# Patient Record
Sex: MALE | HISPANIC OR LATINO | URBAN - METROPOLITAN AREA
[De-identification: names, ages, dates, MRNs, and addresses within clinical notes are randomized per-mention and may not be internally consistent; named-entity substitution may affect disease eponyms.]

---

## 2019-05-16 ENCOUNTER — EMERGENCY (EMERGENCY)
Facility: HOSPITAL | Age: 34
LOS: 1 days | Discharge: HOME | End: 2019-05-16
Attending: EMERGENCY MEDICINE | Admitting: EMERGENCY MEDICINE
Payer: COMMERCIAL

## 2019-05-16 VITALS
SYSTOLIC BLOOD PRESSURE: 129 MMHG | TEMPERATURE: 98 F | DIASTOLIC BLOOD PRESSURE: 65 MMHG | OXYGEN SATURATION: 98 % | RESPIRATION RATE: 18 BRPM | HEART RATE: 58 BPM

## 2019-05-16 VITALS
TEMPERATURE: 98 F | WEIGHT: 189.6 LBS | OXYGEN SATURATION: 97 % | HEIGHT: 60 IN | HEART RATE: 78 BPM | DIASTOLIC BLOOD PRESSURE: 126 MMHG | SYSTOLIC BLOOD PRESSURE: 126 MMHG | RESPIRATION RATE: 18 BRPM

## 2019-05-16 DIAGNOSIS — R10.30 LOWER ABDOMINAL PAIN, UNSPECIFIED: ICD-10-CM

## 2019-05-16 DIAGNOSIS — K92.0 HEMATEMESIS: ICD-10-CM

## 2019-05-16 LAB
ALBUMIN SERPL ELPH-MCNC: 4.9 G/DL — SIGNIFICANT CHANGE UP (ref 3.3–5)
ALP SERPL-CCNC: 60 U/L — SIGNIFICANT CHANGE UP (ref 40–120)
ALT FLD-CCNC: 27 U/L — SIGNIFICANT CHANGE UP (ref 10–45)
ANION GAP SERPL CALC-SCNC: 14 MMOL/L — SIGNIFICANT CHANGE UP (ref 5–17)
APPEARANCE UR: CLEAR — SIGNIFICANT CHANGE UP
APTT BLD: 31.5 SEC — SIGNIFICANT CHANGE UP (ref 27.5–36.3)
AST SERPL-CCNC: 27 U/L — SIGNIFICANT CHANGE UP (ref 10–40)
BILIRUB SERPL-MCNC: 0.4 MG/DL — SIGNIFICANT CHANGE UP (ref 0.2–1.2)
BILIRUB UR-MCNC: NEGATIVE — SIGNIFICANT CHANGE UP
BUN SERPL-MCNC: 10 MG/DL — SIGNIFICANT CHANGE UP (ref 7–23)
CALCIUM SERPL-MCNC: 9.4 MG/DL — SIGNIFICANT CHANGE UP (ref 8.4–10.5)
CHLORIDE SERPL-SCNC: 103 MMOL/L — SIGNIFICANT CHANGE UP (ref 96–108)
CO2 SERPL-SCNC: 23 MMOL/L — SIGNIFICANT CHANGE UP (ref 22–31)
COLOR SPEC: YELLOW — SIGNIFICANT CHANGE UP
CREAT SERPL-MCNC: 1.01 MG/DL — SIGNIFICANT CHANGE UP (ref 0.5–1.3)
DIFF PNL FLD: NEGATIVE — SIGNIFICANT CHANGE UP
GLUCOSE SERPL-MCNC: 95 MG/DL — SIGNIFICANT CHANGE UP (ref 70–99)
GLUCOSE UR QL: NEGATIVE — SIGNIFICANT CHANGE UP
HCT VFR BLD CALC: 45.2 % — SIGNIFICANT CHANGE UP (ref 39–50)
HGB BLD-MCNC: 15.2 G/DL — SIGNIFICANT CHANGE UP (ref 13–17)
INR BLD: 0.98 — SIGNIFICANT CHANGE UP (ref 0.88–1.16)
KETONES UR-MCNC: NEGATIVE — SIGNIFICANT CHANGE UP
LEUKOCYTE ESTERASE UR-ACNC: NEGATIVE — SIGNIFICANT CHANGE UP
LIDOCAIN IGE QN: 22 U/L — SIGNIFICANT CHANGE UP (ref 7–60)
MCHC RBC-ENTMCNC: 30.3 PG — SIGNIFICANT CHANGE UP (ref 27–34)
MCHC RBC-ENTMCNC: 33.6 GM/DL — SIGNIFICANT CHANGE UP (ref 32–36)
MCV RBC AUTO: 90 FL — SIGNIFICANT CHANGE UP (ref 80–100)
NITRITE UR-MCNC: NEGATIVE — SIGNIFICANT CHANGE UP
NRBC # BLD: 0 /100 WBCS — SIGNIFICANT CHANGE UP (ref 0–0)
OB PNL STL: NEGATIVE — SIGNIFICANT CHANGE UP
PH UR: 6.5 — SIGNIFICANT CHANGE UP (ref 5–8)
PLATELET # BLD AUTO: 371 K/UL — SIGNIFICANT CHANGE UP (ref 150–400)
POTASSIUM SERPL-MCNC: 4.4 MMOL/L — SIGNIFICANT CHANGE UP (ref 3.5–5.3)
POTASSIUM SERPL-SCNC: 4.4 MMOL/L — SIGNIFICANT CHANGE UP (ref 3.5–5.3)
PROT SERPL-MCNC: 8.5 G/DL — HIGH (ref 6–8.3)
PROT UR-MCNC: NEGATIVE MG/DL — SIGNIFICANT CHANGE UP
PROTHROM AB SERPL-ACNC: 11.1 SEC — SIGNIFICANT CHANGE UP (ref 10–12.9)
RBC # BLD: 5.02 M/UL — SIGNIFICANT CHANGE UP (ref 4.2–5.8)
RBC # FLD: 13.2 % — SIGNIFICANT CHANGE UP (ref 10.3–14.5)
SODIUM SERPL-SCNC: 140 MMOL/L — SIGNIFICANT CHANGE UP (ref 135–145)
SP GR SPEC: <=1.005 — SIGNIFICANT CHANGE UP (ref 1–1.03)
UROBILINOGEN FLD QL: 0.2 E.U./DL — SIGNIFICANT CHANGE UP
WBC # BLD: 12.78 K/UL — HIGH (ref 3.8–10.5)
WBC # FLD AUTO: 12.78 K/UL — HIGH (ref 3.8–10.5)

## 2019-05-16 PROCEDURE — 81003 URINALYSIS AUTO W/O SCOPE: CPT

## 2019-05-16 PROCEDURE — 71046 X-RAY EXAM CHEST 2 VIEWS: CPT

## 2019-05-16 PROCEDURE — 99285 EMERGENCY DEPT VISIT HI MDM: CPT

## 2019-05-16 PROCEDURE — 96375 TX/PRO/DX INJ NEW DRUG ADDON: CPT | Mod: XU

## 2019-05-16 PROCEDURE — 85610 PROTHROMBIN TIME: CPT

## 2019-05-16 PROCEDURE — 80053 COMPREHEN METABOLIC PANEL: CPT

## 2019-05-16 PROCEDURE — 85027 COMPLETE CBC AUTOMATED: CPT

## 2019-05-16 PROCEDURE — 71046 X-RAY EXAM CHEST 2 VIEWS: CPT | Mod: 26

## 2019-05-16 PROCEDURE — 83690 ASSAY OF LIPASE: CPT

## 2019-05-16 PROCEDURE — 96374 THER/PROPH/DIAG INJ IV PUSH: CPT | Mod: XU

## 2019-05-16 PROCEDURE — 74174 CTA ABD&PLVS W/CONTRAST: CPT | Mod: 26

## 2019-05-16 PROCEDURE — 96361 HYDRATE IV INFUSION ADD-ON: CPT

## 2019-05-16 PROCEDURE — 74174 CTA ABD&PLVS W/CONTRAST: CPT

## 2019-05-16 PROCEDURE — 99284 EMERGENCY DEPT VISIT MOD MDM: CPT | Mod: 25

## 2019-05-16 PROCEDURE — 85730 THROMBOPLASTIN TIME PARTIAL: CPT

## 2019-05-16 PROCEDURE — 36415 COLL VENOUS BLD VENIPUNCTURE: CPT

## 2019-05-16 RX ORDER — SODIUM CHLORIDE 9 MG/ML
1000 INJECTION INTRAMUSCULAR; INTRAVENOUS; SUBCUTANEOUS ONCE
Refills: 0 | Status: COMPLETED | OUTPATIENT
Start: 2019-05-16 | End: 2019-05-16

## 2019-05-16 RX ORDER — PANTOPRAZOLE SODIUM 20 MG/1
80 TABLET, DELAYED RELEASE ORAL ONCE
Refills: 0 | Status: COMPLETED | OUTPATIENT
Start: 2019-05-16 | End: 2019-05-16

## 2019-05-16 RX ORDER — HYDROMORPHONE HYDROCHLORIDE 2 MG/ML
0.5 INJECTION INTRAMUSCULAR; INTRAVENOUS; SUBCUTANEOUS ONCE
Refills: 0 | Status: DISCONTINUED | OUTPATIENT
Start: 2019-05-16 | End: 2019-05-16

## 2019-05-16 RX ADMIN — SODIUM CHLORIDE 1000 MILLILITER(S): 9 INJECTION INTRAMUSCULAR; INTRAVENOUS; SUBCUTANEOUS at 12:38

## 2019-05-16 RX ADMIN — HYDROMORPHONE HYDROCHLORIDE 0.5 MILLIGRAM(S): 2 INJECTION INTRAMUSCULAR; INTRAVENOUS; SUBCUTANEOUS at 10:28

## 2019-05-16 RX ADMIN — HYDROMORPHONE HYDROCHLORIDE 0.5 MILLIGRAM(S): 2 INJECTION INTRAMUSCULAR; INTRAVENOUS; SUBCUTANEOUS at 12:38

## 2019-05-16 RX ADMIN — PANTOPRAZOLE SODIUM 80 MILLIGRAM(S): 20 TABLET, DELAYED RELEASE ORAL at 10:15

## 2019-05-16 RX ADMIN — SODIUM CHLORIDE 1000 MILLILITER(S): 9 INJECTION INTRAMUSCULAR; INTRAVENOUS; SUBCUTANEOUS at 10:28

## 2019-05-16 NOTE — ED ADULT NURSE NOTE - CHPI ED NUR SYMPTOMS NEG
no abdominal distension/no burning urination/no chills/no diarrhea/no dysuria/no fever/no hematuria/no nausea/no vomiting

## 2019-05-16 NOTE — ED PROVIDER NOTE - CLINICAL SUMMARY MEDICAL DECISION MAKING FREE TEXT BOX
33M with hematemesis in context of alcohol abuse, no further episodes of vomiting in ER. Pt denies any other medical problems, no hx of w/d, no liver dz that is known. Pt reports "dark" stool and lower abd pain. No fever. Plan for CXR to eval mediastinum, plan for Abd CT to eval for reported rectal bleed although pt has nl hgb and hemeoccult neg. 33M with hematemesis in context of alcohol abuse, no further episodes of vomiting in ER. Pt denies any other medical problems, no hx of w/d, no liver dz that is known. Pt reports "dark" stool and lower abd pain. No fever. Plan for CXR to eval mediastinum, plan for Abd CT to eval for reported rectal bleed although pt has nl hgb and hemeoccult neg.    Pt obs'd in ED for hours, no further episodes of hematemesis. Can f/u with GI. Nl BUN, doubt severe UGIB. Neg Hemeoccult, neg CTA of abd/pelvis, must f/u with GI. Pain improved pt rodney PO, UA neg, labs notable for mild elevation in wbc count only.

## 2019-05-16 NOTE — ED PROVIDER NOTE - CARE PLAN
Principal Discharge DX:	Lower abdominal pain  Secondary Diagnosis:	Hematemesis, presence of nausea not specified

## 2019-05-16 NOTE — ED PROVIDER NOTE - PHYSICAL EXAMINATION
Gen: well appearing no acute distress conversant  HEENT: oropharynx clear  CV: rrr no murmur  Resp: ctab  Abd: lower abd ttp no rebound/guarding  Rectal: no hematochezia no melena  Ext: no edema to ext  MSK: no cva TTP, no midline back ttp c-s spine

## 2019-05-16 NOTE — ED ADULT NURSE NOTE - NSIMPLEMENTINTERV_GEN_ALL_ED
Implemented All Universal Safety Interventions:  [place] to call system. Call bell, personal items and telephone within reach. Instruct patient to call for assistance. Room bathroom lighting operational. Non-slip footwear when patient is off stretcher. Physically safe environment: no spills, clutter or unnecessary equipment. Stretcher in lowest position, wheels locked, appropriate side rails in place.

## 2019-05-16 NOTE — ED PROVIDER NOTE - OBJECTIVE STATEMENT
33M with hx of alcohol abuse, presenting with lower abd pain and vomiting blood 3x, pt states he may have vomited approx 2 ounces of blood. Spoke to pt in preferred language of Somali. Pt denies any hx of abd surg, no hx of taking medications. States he drinks 3-4 beers daily. Denies hx of withdrawal. Pt reports "dark" stool. states he always has loose stool.

## 2019-05-16 NOTE — ED ADULT NURSE NOTE - OBJECTIVE STATEMENT
32 y/o a&ox3 walked in from front [facility] c/o abdominal Pain. Pt reports abdominal pain for the past 2 month LLQ/RLQ that radiates to the lower back. reports x 4 episodes of bright red blood this morning. Pain upon palpation of lower abdomen. reports ETOH use yesterday. ambulates with a steady gait. denies n/v/d currently. denies fevers chills, SOB, chest pain. no daily mediation use. no blood thinners.

## 2019-05-16 NOTE — ED PROVIDER NOTE - CARE PROVIDER_API CALL
Emmanuel Brown)  [facility]  [address], [address]  [place] [address]  Phone: [phone]  Fax: [phone]  Follow Up Time:

## 2020-12-30 ENCOUNTER — INPATIENT (INPATIENT)
Facility: HOSPITAL | Age: 35
LOS: 1 days | Discharge: HOME | DRG: 241 | End: 2021-01-01
Attending: FAMILY MEDICINE | Admitting: HOSPITALIST
Payer: MEDICAID

## 2020-12-30 VITALS
DIASTOLIC BLOOD PRESSURE: 112 MMHG | OXYGEN SATURATION: 100 % | TEMPERATURE: 98 F | HEART RATE: 88 BPM | SYSTOLIC BLOOD PRESSURE: 127 MMHG | RESPIRATION RATE: 22 BRPM

## 2020-12-30 LAB
ALBUMIN SERPL ELPH-MCNC: 4.1 G/DL — SIGNIFICANT CHANGE UP (ref 3.3–5)
ALP SERPL-CCNC: 72 U/L — SIGNIFICANT CHANGE UP (ref 40–120)
ALT FLD-CCNC: 47 U/L — SIGNIFICANT CHANGE UP (ref 12–78)
ANION GAP SERPL CALC-SCNC: 10 MMOL/L — SIGNIFICANT CHANGE UP (ref 5–17)
APPEARANCE UR: CLEAR — SIGNIFICANT CHANGE UP
APTT BLD: 30.8 SEC — SIGNIFICANT CHANGE UP (ref 27.5–35.5)
AST SERPL-CCNC: 42 U/L — HIGH (ref 15–37)
BASOPHILS # BLD AUTO: 0.12 K/UL — SIGNIFICANT CHANGE UP (ref 0–0.2)
BASOPHILS NFR BLD AUTO: 1.4 % — SIGNIFICANT CHANGE UP (ref 0–2)
BILIRUB SERPL-MCNC: 0.4 MG/DL — SIGNIFICANT CHANGE UP (ref 0.2–1.2)
BILIRUB UR-MCNC: NEGATIVE — SIGNIFICANT CHANGE UP
BUN SERPL-MCNC: 12 MG/DL — SIGNIFICANT CHANGE UP (ref 7–23)
CALCIUM SERPL-MCNC: 8.7 MG/DL — SIGNIFICANT CHANGE UP (ref 8.5–10.1)
CHLORIDE SERPL-SCNC: 102 MMOL/L — SIGNIFICANT CHANGE UP (ref 96–108)
CO2 SERPL-SCNC: 27 MMOL/L — SIGNIFICANT CHANGE UP (ref 22–31)
COLOR SPEC: YELLOW — SIGNIFICANT CHANGE UP
CREAT SERPL-MCNC: 1.02 MG/DL — SIGNIFICANT CHANGE UP (ref 0.5–1.3)
DIFF PNL FLD: NEGATIVE — SIGNIFICANT CHANGE UP
EOSINOPHIL # BLD AUTO: 0.06 K/UL — SIGNIFICANT CHANGE UP (ref 0–0.5)
EOSINOPHIL NFR BLD AUTO: 0.7 % — SIGNIFICANT CHANGE UP (ref 0–6)
GLUCOSE SERPL-MCNC: 108 MG/DL — HIGH (ref 70–99)
GLUCOSE UR QL: NEGATIVE MG/DL — SIGNIFICANT CHANGE UP
HCT VFR BLD CALC: 41.9 % — SIGNIFICANT CHANGE UP (ref 39–50)
HCT VFR BLD CALC: 46.4 % — SIGNIFICANT CHANGE UP (ref 39–50)
HGB BLD-MCNC: 14.1 G/DL — SIGNIFICANT CHANGE UP (ref 13–17)
HGB BLD-MCNC: 16.3 G/DL — SIGNIFICANT CHANGE UP (ref 13–17)
IMM GRANULOCYTES NFR BLD AUTO: 0.2 % — SIGNIFICANT CHANGE UP (ref 0–1.5)
INR BLD: 0.97 RATIO — SIGNIFICANT CHANGE UP (ref 0.88–1.16)
KETONES UR-MCNC: NEGATIVE — SIGNIFICANT CHANGE UP
LACTATE SERPL-SCNC: 2.8 MMOL/L — HIGH (ref 0.7–2)
LEUKOCYTE ESTERASE UR-ACNC: NEGATIVE — SIGNIFICANT CHANGE UP
LIDOCAIN IGE QN: 131 U/L — SIGNIFICANT CHANGE UP (ref 73–393)
LYMPHOCYTES # BLD AUTO: 3.7 K/UL — HIGH (ref 1–3.3)
LYMPHOCYTES # BLD AUTO: 42.4 % — SIGNIFICANT CHANGE UP (ref 13–44)
MCHC RBC-ENTMCNC: 31.6 PG — SIGNIFICANT CHANGE UP (ref 27–34)
MCHC RBC-ENTMCNC: 35.1 GM/DL — SIGNIFICANT CHANGE UP (ref 32–36)
MCV RBC AUTO: 89.9 FL — SIGNIFICANT CHANGE UP (ref 80–100)
MONOCYTES # BLD AUTO: 0.67 K/UL — SIGNIFICANT CHANGE UP (ref 0–0.9)
MONOCYTES NFR BLD AUTO: 7.7 % — SIGNIFICANT CHANGE UP (ref 2–14)
NEUTROPHILS # BLD AUTO: 4.15 K/UL — SIGNIFICANT CHANGE UP (ref 1.8–7.4)
NEUTROPHILS NFR BLD AUTO: 47.6 % — SIGNIFICANT CHANGE UP (ref 43–77)
NITRITE UR-MCNC: NEGATIVE — SIGNIFICANT CHANGE UP
PH UR: 8 — SIGNIFICANT CHANGE UP (ref 5–8)
PLATELET # BLD AUTO: 322 K/UL — SIGNIFICANT CHANGE UP (ref 150–400)
POTASSIUM SERPL-MCNC: 3.5 MMOL/L — SIGNIFICANT CHANGE UP (ref 3.5–5.3)
POTASSIUM SERPL-SCNC: 3.5 MMOL/L — SIGNIFICANT CHANGE UP (ref 3.5–5.3)
PROT SERPL-MCNC: 8.1 GM/DL — SIGNIFICANT CHANGE UP (ref 6–8.3)
PROT UR-MCNC: NEGATIVE MG/DL — SIGNIFICANT CHANGE UP
PROTHROM AB SERPL-ACNC: 11.3 SEC — SIGNIFICANT CHANGE UP (ref 10.6–13.6)
RBC # BLD: 5.16 M/UL — SIGNIFICANT CHANGE UP (ref 4.2–5.8)
RBC # FLD: 14 % — SIGNIFICANT CHANGE UP (ref 10.3–14.5)
SODIUM SERPL-SCNC: 139 MMOL/L — SIGNIFICANT CHANGE UP (ref 135–145)
SP GR SPEC: 1.01 — SIGNIFICANT CHANGE UP (ref 1.01–1.02)
UROBILINOGEN FLD QL: NEGATIVE MG/DL — SIGNIFICANT CHANGE UP
WBC # BLD: 8.72 K/UL — SIGNIFICANT CHANGE UP (ref 3.8–10.5)
WBC # FLD AUTO: 8.72 K/UL — SIGNIFICANT CHANGE UP (ref 3.8–10.5)

## 2020-12-30 PROCEDURE — 71045 X-RAY EXAM CHEST 1 VIEW: CPT | Mod: 26

## 2020-12-30 PROCEDURE — 74177 CT ABD & PELVIS W/CONTRAST: CPT | Mod: 26

## 2020-12-30 RX ORDER — SODIUM CHLORIDE 9 MG/ML
1000 INJECTION INTRAMUSCULAR; INTRAVENOUS; SUBCUTANEOUS ONCE
Refills: 0 | Status: COMPLETED | OUTPATIENT
Start: 2020-12-30 | End: 2020-12-30

## 2020-12-30 RX ORDER — PANTOPRAZOLE SODIUM 20 MG/1
8 TABLET, DELAYED RELEASE ORAL
Qty: 80 | Refills: 0 | Status: DISCONTINUED | OUTPATIENT
Start: 2020-12-30 | End: 2021-01-01

## 2020-12-30 RX ORDER — ONDANSETRON 8 MG/1
4 TABLET, FILM COATED ORAL ONCE
Refills: 0 | Status: COMPLETED | OUTPATIENT
Start: 2020-12-30 | End: 2020-12-30

## 2020-12-30 RX ORDER — PANTOPRAZOLE SODIUM 20 MG/1
80 TABLET, DELAYED RELEASE ORAL ONCE
Refills: 0 | Status: COMPLETED | OUTPATIENT
Start: 2020-12-30 | End: 2020-12-30

## 2020-12-30 RX ORDER — HYDROMORPHONE HYDROCHLORIDE 2 MG/ML
1 INJECTION INTRAMUSCULAR; INTRAVENOUS; SUBCUTANEOUS ONCE
Refills: 0 | Status: DISCONTINUED | OUTPATIENT
Start: 2020-12-30 | End: 2020-12-30

## 2020-12-30 RX ADMIN — SODIUM CHLORIDE 1000 MILLILITER(S): 9 INJECTION INTRAMUSCULAR; INTRAVENOUS; SUBCUTANEOUS at 20:45

## 2020-12-30 RX ADMIN — HYDROMORPHONE HYDROCHLORIDE 1 MILLIGRAM(S): 2 INJECTION INTRAMUSCULAR; INTRAVENOUS; SUBCUTANEOUS at 19:22

## 2020-12-30 RX ADMIN — SODIUM CHLORIDE 1000 MILLILITER(S): 9 INJECTION INTRAMUSCULAR; INTRAVENOUS; SUBCUTANEOUS at 19:22

## 2020-12-30 RX ADMIN — PANTOPRAZOLE SODIUM 10 MG/HR: 20 TABLET, DELAYED RELEASE ORAL at 22:54

## 2020-12-30 RX ADMIN — ONDANSETRON 4 MILLIGRAM(S): 8 TABLET, FILM COATED ORAL at 19:22

## 2020-12-30 RX ADMIN — SODIUM CHLORIDE 1000 MILLILITER(S): 9 INJECTION INTRAMUSCULAR; INTRAVENOUS; SUBCUTANEOUS at 22:54

## 2020-12-30 RX ADMIN — PANTOPRAZOLE SODIUM 80 MILLIGRAM(S): 20 TABLET, DELAYED RELEASE ORAL at 19:22

## 2020-12-30 NOTE — ED PROVIDER NOTE - OBJECTIVE STATEMENT
36 y/o male with no pertinent PMHx presents to the ED c/o abd pain, vomiting blood onset yesterday. Pt states he drinks everyday, approximately 6 beers. States he has been having black stools x2 years; pt has followed up with GI with colonoscopies and endoscopies. Follows with PMD in [place]. Current everyday smoker, 1-2 per day, former drug user. NKDA.

## 2020-12-30 NOTE — ED PROVIDER NOTE - CONSTITUTIONAL, MLM
normal... Writhing on stretcher, uncomfortable appearing , awake, alert, oriented to person, place, time/situation. +dried blood on shirt

## 2020-12-30 NOTE — ED PROVIDER NOTE - PROGRESS NOTE DETAILS
Pt with normal hemoglobin. Heme neg rectal exam [id number] exp 9/30/21Joseluis LARSEN Late note: pt admitted due to drop in Hb although the guiac was neg. Joseluis LARSEN

## 2020-12-30 NOTE — ED PROVIDER NOTE - CLINICAL SUMMARY MEDICAL DECISION MAKING FREE TEXT BOX
Likely GI bleed, will obtain labs, pain control, likely admission. Likely GI bleed, Protonix drip, reassess.

## 2020-12-31 DIAGNOSIS — K92.2 GASTROINTESTINAL HEMORRHAGE, UNSPECIFIED: ICD-10-CM

## 2020-12-31 LAB
ANION GAP SERPL CALC-SCNC: 8 MMOL/L — SIGNIFICANT CHANGE UP (ref 5–17)
BUN SERPL-MCNC: 15 MG/DL — SIGNIFICANT CHANGE UP (ref 7–23)
CALCIUM SERPL-MCNC: 8.1 MG/DL — LOW (ref 8.5–10.1)
CHLORIDE SERPL-SCNC: 107 MMOL/L — SIGNIFICANT CHANGE UP (ref 96–108)
CO2 SERPL-SCNC: 25 MMOL/L — SIGNIFICANT CHANGE UP (ref 22–31)
CREAT SERPL-MCNC: 1.04 MG/DL — SIGNIFICANT CHANGE UP (ref 0.5–1.3)
GLUCOSE SERPL-MCNC: 76 MG/DL — SIGNIFICANT CHANGE UP (ref 70–99)
HCT VFR BLD CALC: 38.8 % — LOW (ref 39–50)
HCT VFR BLD CALC: 40.4 % — SIGNIFICANT CHANGE UP (ref 39–50)
HCT VFR BLD CALC: 40.5 % — SIGNIFICANT CHANGE UP (ref 39–50)
HCT VFR BLD CALC: 41 % — SIGNIFICANT CHANGE UP (ref 39–50)
HGB BLD-MCNC: 13.2 G/DL — SIGNIFICANT CHANGE UP (ref 13–17)
HGB BLD-MCNC: 13.3 G/DL — SIGNIFICANT CHANGE UP (ref 13–17)
HGB BLD-MCNC: 13.4 G/DL — SIGNIFICANT CHANGE UP (ref 13–17)
HGB BLD-MCNC: 13.6 G/DL — SIGNIFICANT CHANGE UP (ref 13–17)
MAGNESIUM SERPL-MCNC: 2.3 MG/DL — SIGNIFICANT CHANGE UP (ref 1.6–2.6)
MCHC RBC-ENTMCNC: 30.9 PG — SIGNIFICANT CHANGE UP (ref 27–34)
MCHC RBC-ENTMCNC: 30.9 PG — SIGNIFICANT CHANGE UP (ref 27–34)
MCHC RBC-ENTMCNC: 31.5 PG — SIGNIFICANT CHANGE UP (ref 27–34)
MCHC RBC-ENTMCNC: 31.7 PG — SIGNIFICANT CHANGE UP (ref 27–34)
MCHC RBC-ENTMCNC: 32.7 GM/DL — SIGNIFICANT CHANGE UP (ref 32–36)
MCHC RBC-ENTMCNC: 32.8 GM/DL — SIGNIFICANT CHANGE UP (ref 32–36)
MCHC RBC-ENTMCNC: 33.7 GM/DL — SIGNIFICANT CHANGE UP (ref 32–36)
MCHC RBC-ENTMCNC: 34 GM/DL — SIGNIFICANT CHANGE UP (ref 32–36)
MCV RBC AUTO: 93 FL — SIGNIFICANT CHANGE UP (ref 80–100)
MCV RBC AUTO: 93.5 FL — SIGNIFICANT CHANGE UP (ref 80–100)
MCV RBC AUTO: 94 FL — SIGNIFICANT CHANGE UP (ref 80–100)
MCV RBC AUTO: 94.7 FL — SIGNIFICANT CHANGE UP (ref 80–100)
PHOSPHATE SERPL-MCNC: 4.3 MG/DL — SIGNIFICANT CHANGE UP (ref 2.5–4.5)
PLATELET # BLD AUTO: 238 K/UL — SIGNIFICANT CHANGE UP (ref 150–400)
PLATELET # BLD AUTO: 252 K/UL — SIGNIFICANT CHANGE UP (ref 150–400)
PLATELET # BLD AUTO: 261 K/UL — SIGNIFICANT CHANGE UP (ref 150–400)
PLATELET # BLD AUTO: 276 K/UL — SIGNIFICANT CHANGE UP (ref 150–400)
POTASSIUM SERPL-MCNC: 3.9 MMOL/L — SIGNIFICANT CHANGE UP (ref 3.5–5.3)
POTASSIUM SERPL-SCNC: 3.9 MMOL/L — SIGNIFICANT CHANGE UP (ref 3.5–5.3)
RBC # BLD: 4.17 M/UL — LOW (ref 4.2–5.8)
RBC # BLD: 4.31 M/UL — SIGNIFICANT CHANGE UP (ref 4.2–5.8)
RBC # BLD: 4.32 M/UL — SIGNIFICANT CHANGE UP (ref 4.2–5.8)
RBC # BLD: 4.33 M/UL — SIGNIFICANT CHANGE UP (ref 4.2–5.8)
RBC # FLD: 13.7 % — SIGNIFICANT CHANGE UP (ref 10.3–14.5)
RBC # FLD: 14 % — SIGNIFICANT CHANGE UP (ref 10.3–14.5)
RBC # FLD: 14 % — SIGNIFICANT CHANGE UP (ref 10.3–14.5)
RBC # FLD: 14.3 % — SIGNIFICANT CHANGE UP (ref 10.3–14.5)
SARS-COV-2 IGG SERPL QL IA: NEGATIVE — SIGNIFICANT CHANGE UP
SARS-COV-2 IGM SERPL IA-ACNC: 0.07 INDEX — SIGNIFICANT CHANGE UP
SARS-COV-2 RNA SPEC QL NAA+PROBE: SIGNIFICANT CHANGE UP
SODIUM SERPL-SCNC: 140 MMOL/L — SIGNIFICANT CHANGE UP (ref 135–145)
WBC # BLD: 7.55 K/UL — SIGNIFICANT CHANGE UP (ref 3.8–10.5)
WBC # BLD: 8.53 K/UL — SIGNIFICANT CHANGE UP (ref 3.8–10.5)
WBC # BLD: 9.11 K/UL — SIGNIFICANT CHANGE UP (ref 3.8–10.5)
WBC # BLD: 9.49 K/UL — SIGNIFICANT CHANGE UP (ref 3.8–10.5)
WBC # FLD AUTO: 7.55 K/UL — SIGNIFICANT CHANGE UP (ref 3.8–10.5)
WBC # FLD AUTO: 8.53 K/UL — SIGNIFICANT CHANGE UP (ref 3.8–10.5)
WBC # FLD AUTO: 9.11 K/UL — SIGNIFICANT CHANGE UP (ref 3.8–10.5)
WBC # FLD AUTO: 9.49 K/UL — SIGNIFICANT CHANGE UP (ref 3.8–10.5)

## 2020-12-31 PROCEDURE — 83735 ASSAY OF MAGNESIUM: CPT

## 2020-12-31 PROCEDURE — 99223 1ST HOSP IP/OBS HIGH 75: CPT

## 2020-12-31 PROCEDURE — 85027 COMPLETE CBC AUTOMATED: CPT

## 2020-12-31 PROCEDURE — 90686 IIV4 VACC NO PRSV 0.5 ML IM: CPT

## 2020-12-31 PROCEDURE — G0008: CPT

## 2020-12-31 PROCEDURE — 86769 SARS-COV-2 COVID-19 ANTIBODY: CPT

## 2020-12-31 PROCEDURE — 80076 HEPATIC FUNCTION PANEL: CPT

## 2020-12-31 PROCEDURE — 80048 BASIC METABOLIC PNL TOTAL CA: CPT

## 2020-12-31 PROCEDURE — 36415 COLL VENOUS BLD VENIPUNCTURE: CPT

## 2020-12-31 PROCEDURE — 84100 ASSAY OF PHOSPHORUS: CPT

## 2020-12-31 PROCEDURE — C9113: CPT

## 2020-12-31 RX ORDER — THIAMINE MONONITRATE (VIT B1) 100 MG
100 TABLET ORAL DAILY
Refills: 0 | Status: DISCONTINUED | OUTPATIENT
Start: 2020-12-31 | End: 2021-01-01

## 2020-12-31 RX ORDER — ONDANSETRON 8 MG/1
4 TABLET, FILM COATED ORAL
Refills: 0 | Status: DISCONTINUED | OUTPATIENT
Start: 2020-12-31 | End: 2021-01-01

## 2020-12-31 RX ORDER — FOLIC ACID 0.8 MG
1 TABLET ORAL DAILY
Refills: 0 | Status: DISCONTINUED | OUTPATIENT
Start: 2020-12-31 | End: 2021-01-01

## 2020-12-31 RX ORDER — INFLUENZA VIRUS VACCINE 15; 15; 15; 15 UG/.5ML; UG/.5ML; UG/.5ML; UG/.5ML
0.5 SUSPENSION INTRAMUSCULAR ONCE
Refills: 0 | Status: COMPLETED | OUTPATIENT
Start: 2020-12-31 | End: 2021-01-01

## 2020-12-31 RX ORDER — LANOLIN ALCOHOL/MO/W.PET/CERES
5 CREAM (GRAM) TOPICAL AT BEDTIME
Refills: 0 | Status: DISCONTINUED | OUTPATIENT
Start: 2020-12-31 | End: 2021-01-01

## 2020-12-31 RX ORDER — SODIUM CHLORIDE 9 MG/ML
1000 INJECTION INTRAMUSCULAR; INTRAVENOUS; SUBCUTANEOUS
Refills: 0 | Status: DISCONTINUED | OUTPATIENT
Start: 2020-12-31 | End: 2021-01-01

## 2020-12-31 RX ADMIN — Medication 2 MILLIGRAM(S): at 13:56

## 2020-12-31 RX ADMIN — Medication 2 MILLIGRAM(S): at 09:15

## 2020-12-31 RX ADMIN — SODIUM CHLORIDE 75 MILLILITER(S): 9 INJECTION INTRAMUSCULAR; INTRAVENOUS; SUBCUTANEOUS at 03:40

## 2020-12-31 RX ADMIN — SODIUM CHLORIDE 75 MILLILITER(S): 9 INJECTION INTRAMUSCULAR; INTRAVENOUS; SUBCUTANEOUS at 17:15

## 2020-12-31 RX ADMIN — Medication 2 MILLIGRAM(S): at 03:32

## 2020-12-31 RX ADMIN — Medication 5 MILLIGRAM(S): at 23:38

## 2020-12-31 RX ADMIN — Medication 1 TABLET(S): at 09:14

## 2020-12-31 RX ADMIN — PANTOPRAZOLE SODIUM 10 MG/HR: 20 TABLET, DELAYED RELEASE ORAL at 17:15

## 2020-12-31 RX ADMIN — Medication 1 MILLIGRAM(S): at 09:14

## 2020-12-31 RX ADMIN — Medication 100 MILLIGRAM(S): at 09:14

## 2020-12-31 RX ADMIN — Medication 2 MILLIGRAM(S): at 21:30

## 2020-12-31 RX ADMIN — Medication 2 MILLIGRAM(S): at 17:15

## 2020-12-31 NOTE — H&P ADULT - ASSESSMENT
34 y/o M PMHx significant for alcohol dependency presents to  for further evaluation and management of c/o generalized abdominal pain, associated with an episode of hematemesis yesterday. The patient states that he drinks daily (approximately 6 beers) and has noted intermittent melenic bowel movements over the preceding 2 years. He states that he has followed up with his Gastroenterologist and has had surveillance colonoscopies and endoscopies since. Labs => LA 2.8 -> 1.3, Hgb/Hct 16.3/46.4 -> 14.1/41.9. CT ABD/Pelvis w/ IV contrast => Small hiatal hernia. Question of mild colitis of the proximal ascending colon.  34 y/o M PMHx significant for alcohol dependency presents to  for further evaluation and management of c/o generalized abdominal pain, associated with an episode of hematemesis yesterday. The patient states that he drinks daily (approximately 6 beers) and has noted intermittent melenic bowel movements over the preceding 2 years. He states that he has followed up with his Gastroenterologist and has had surveillance colonoscopies and endoscopies since. Labs => LA 2.8 -> 1.3, Hgb/Hct 16.3/46.4 -> 14.1/41.9. CT ABD/Pelvis w/ IV contrast => Small hiatal hernia. Question of mild colitis of the proximal ascending colon.     #GI Bleeding  ~admit to Medicine  ~likely source; upper GI  ~cont. PPI gtt per protocol  ~serial CBCs  ~Type & Screen  ~transfuse prn  ~patient agreeable to stay for further evaluation  ~f/u w/ formal Gastroenterology consultation in the am  ~NPO for now  ~cont. IV hydration    #Vte ppx  ~IMPROVE Vte Risk Score is 0  ~cont. SCDs for now 36 y/o M PMHx significant for alcohol dependency presents to  for further evaluation and management of c/o generalized abdominal pain, associated with an episode of hematemesis yesterday. The patient states that he drinks daily (approximately 6 beers) and has noted intermittent melenic bowel movements over the preceding 2 years. He states that he has followed up with his Gastroenterologist and has had surveillance colonoscopies and endoscopies since. Labs => LA 2.8 -> 1.3, Hgb/Hct 16.3/46.4 -> 14.1/41.9. CT ABD/Pelvis w/ IV contrast => Small hiatal hernia. Question of mild colitis of the proximal ascending colon.     #GI Bleeding  ~admit to Medicine  ~likely source; upper GI  ~cont. PPI gtt per protocol  ~serial CBCs  ~Type & Screen  ~transfuse prn  ~patient agreeable to stay for further evaluation  ~f/u w/ formal Gastroenterology consultation in the am  ~NPO for now  ~cont. IV hydration    #Alcohol Abuse  ~cont. CIWA protocol  ~cont. IV hydration    #Vte ppx  ~IMPROVE Vte Risk Score is 0  ~cont. SCDs for now

## 2020-12-31 NOTE — H&P ADULT - NSHPSOCIALHISTORY_GEN_ALL_CORE
Current everyday smoker, 1-2 per day, former drug user. Drinks 5-6 beers/daily  Current everyday smoker, 1-2 per day  former drug user.  Family lives in [place]

## 2020-12-31 NOTE — SBIRT NOTE ADULT - NSSBIRTALCPASSREFTXDET_GEN_A_CORE
pt acknowledges ETOH abuse however declines inpt rehab. Pt willing to try outpt ETOH rehab. Provided [facility] Folder-wstates will call [facility] or [facility] on own.

## 2020-12-31 NOTE — PATIENT PROFILE ADULT - TOBACCO USE
Current every day smoker GERD (gastroesophageal reflux disease) Hyperlipidemia, unspecified hyperlipidemia type

## 2020-12-31 NOTE — ED ADULT NURSE NOTE - OBJECTIVE STATEMENT
Pt c/o abdominal pain and vomiting x2 days. Pt reports vomiting blood and having dark bowel movement. Pt denies CP, SOB. Pt reports drinking alcohol every day. Last drink  yesterday morning. No diaphoresis noted.

## 2020-12-31 NOTE — H&P ADULT - NSHPPHYSICALEXAM_GEN_ALL_CORE
Vital Signs Last 24 Hrs  T(C): 36.8 (30 Dec 2020 20:20), Max: 36.8 (30 Dec 2020 20:20)  T(F): 98.2 (30 Dec 2020 20:20), Max: 98.2 (30 Dec 2020 20:20)  HR: 80 (30 Dec 2020 20:20) (80 - 88)  BP: 127/95 (30 Dec 2020 20:20) (127/95 - 127/112)  RR: 21 (30 Dec 2020 20:20) (21 - 22)  SpO2: 100% (30 Dec 2020 20:20) (100% - 100%)

## 2020-12-31 NOTE — ED ADULT NURSE NOTE - NSIMPLEMENTINTERV_GEN_ALL_ED
Implemented All Fall Risk Interventions:  [place] to call system. Call bell, personal items and telephone within reach. Instruct patient to call for assistance. Room bathroom lighting operational. Non-slip footwear when patient is off stretcher. Physically safe environment: no spills, clutter or unnecessary equipment. Stretcher in lowest position, wheels locked, appropriate side rails in place. Provide visual cue, wrist band, yellow gown, etc. Monitor gait and stability. Monitor for mental status changes and reorient to person, place, and time. Review medications for side effects contributing to fall risk. Reinforce activity limits and safety measures with patient and family.

## 2020-12-31 NOTE — H&P ADULT - HISTORY OF PRESENT ILLNESS
34 y/o M PMHx significant for alcohol dependency presents to  for further evaluation and management of c/o generalized abdominal pain, associated with an episode of hematemesis yesterday. The patient states that he drinks daily (approximately 6 beers) and has noted intermittent melenic bowel movements over the preceding 2 years. He states that he has followed up with his Gastroenterologist and has had surveillance colonoscopies and endoscopies since. Labs => LA 2.8 -> 1.3, Hgb/Hct 16.3/46.4 -> 14.1/41.9. CT ABD/Pelvis w/ IV contrast => Small hiatal hernia. Question of mild colitis of the proximal ascending colon.    34 y/o M PMHx significant for alcohol dependency presents to  for further evaluation and management of c/o epigastric abdominal pain, associated with an episode of hematemesis around 5pm (12/30). The patient states that he drinks daily (approximately 6 beers) and has noted intermittent melenic bowel movements over the preceding 2 years. He states that he was scheduled for a surveillance colonoscopy and endoscopy with his Gastroenterologist, but never followed through. Labs => LA 2.8 -> 1.3, Hgb/Hct 16.3/46.4 -> 14.1/41.9. CT ABD/Pelvis w/ IV contrast => Small hiatal hernia. Question of mild colitis of the proximal ascending colon.

## 2021-01-01 VITALS
TEMPERATURE: 97 F | SYSTOLIC BLOOD PRESSURE: 130 MMHG | DIASTOLIC BLOOD PRESSURE: 64 MMHG | OXYGEN SATURATION: 99 % | RESPIRATION RATE: 18 BRPM | HEART RATE: 62 BPM

## 2021-01-01 LAB
ANION GAP SERPL CALC-SCNC: 5 MMOL/L — SIGNIFICANT CHANGE UP (ref 5–17)
BUN SERPL-MCNC: 13 MG/DL — SIGNIFICANT CHANGE UP (ref 7–23)
CALCIUM SERPL-MCNC: 8.6 MG/DL — SIGNIFICANT CHANGE UP (ref 8.5–10.1)
CHLORIDE SERPL-SCNC: 108 MMOL/L — SIGNIFICANT CHANGE UP (ref 96–108)
CO2 SERPL-SCNC: 25 MMOL/L — SIGNIFICANT CHANGE UP (ref 22–31)
CREAT SERPL-MCNC: 1.03 MG/DL — SIGNIFICANT CHANGE UP (ref 0.5–1.3)
GLUCOSE SERPL-MCNC: 84 MG/DL — SIGNIFICANT CHANGE UP (ref 70–99)
MAGNESIUM SERPL-MCNC: 2.1 MG/DL — SIGNIFICANT CHANGE UP (ref 1.6–2.6)
PHOSPHATE SERPL-MCNC: 3.7 MG/DL — SIGNIFICANT CHANGE UP (ref 2.5–4.5)
POTASSIUM SERPL-MCNC: 3.8 MMOL/L — SIGNIFICANT CHANGE UP (ref 3.5–5.3)
POTASSIUM SERPL-SCNC: 3.8 MMOL/L — SIGNIFICANT CHANGE UP (ref 3.5–5.3)
SODIUM SERPL-SCNC: 138 MMOL/L — SIGNIFICANT CHANGE UP (ref 135–145)

## 2021-01-01 PROCEDURE — 99238 HOSP IP/OBS DSCHRG MGMT 30/<: CPT

## 2021-01-01 RX ORDER — FOLIC ACID 0.8 MG
1 TABLET ORAL
Qty: 0 | Refills: 0 | DISCHARGE
Start: 2021-01-01

## 2021-01-01 RX ORDER — THIAMINE MONONITRATE (VIT B1) 100 MG
1 TABLET ORAL
Qty: 0 | Refills: 0 | DISCHARGE
Start: 2021-01-01

## 2021-01-01 RX ADMIN — Medication 100 MILLIGRAM(S): at 13:11

## 2021-01-01 RX ADMIN — INFLUENZA VIRUS VACCINE 0.5 MILLILITER(S): 15; 15; 15; 15 SUSPENSION INTRAMUSCULAR at 13:16

## 2021-01-01 RX ADMIN — Medication 1.5 MILLIGRAM(S): at 05:48

## 2021-01-01 RX ADMIN — Medication 1 MILLIGRAM(S): at 13:11

## 2021-01-01 RX ADMIN — Medication 1 TABLET(S): at 13:11

## 2021-01-01 NOTE — DISCHARGE NOTE NURSING/CASE MANAGEMENT/SOCIAL WORK - PATIENT PORTAL LINK FT
You can access the FollowMyHealth Patient Portal offered by [facility] by registering at the following website: [email or web address]. By joining [employer]’s FollowMyHealth portal, you will also be able to view your health information using other applications (apps) compatible with our system.

## 2021-01-01 NOTE — DISCHARGE NOTE PROVIDER - HOSPITAL COURSE
· Subjective and Objective:   34 y/o M PMHx significant for alcohol dependency presents to  for further evaluation and management of c/o epigastric abdominal pain, associated with an episode of hematemesis around 5pm (12/30). The patient states that he drinks daily (approximately 6 beers) and has noted intermittent melenic bowel movements over the preceding 2 years. He states that he was scheduled for a surveillance colonoscopy and endoscopy with his Gastroenterologist, but never followed through. Labs => LA 2.8 -> 1.3, Hgb/Hct 16.3/46.4 -> 14.1/41.9. CT ABD/Pelvis w/ IV contrast => Small hiatal hernia. Question of mild colitis of the proximal ascending colon.     Patient seen today, feels well, no further nausea or vomiting. Reports no withdrawals from alcohol. Voiding well.   No chest pain or sob.       REVIEW OF SYSTEMS:  CONSTITUTIONAL: No weakness, fevers or chills  EYES/ENT: No visual changes;  No vertigo or throat pain   NECK: No pain or stiffness  RESPIRATORY: No cough, wheezing, hemoptysis; No shortness of breath  CARDIOVASCULAR: No chest pain or palpitations  GASTROINTESTINAL: No abdominal or epigastric pain. No nausea, vomiting, or hematemesis; No diarrhea or constipation. No melena or hematochezia.  GENITOURINARY: No dysuria, frequency or hematuria  NEUROLOGICAL: No numbness or weakness  SKIN: No itching, burning, rashes, or lesions   All other review of systems is negative unless indicated above    Vital Signs Last 24 Hrs  T(C): 36.3 (01 Jan 2021 10:03), Max: 36.8 (31 Dec 2020 21:23)  T(F): 97.4 (01 Jan 2021 10:03), Max: 98.2 (31 Dec 2020 21:23)  HR: 62 (01 Jan 2021 10:03) (53 - 77)  BP: 130/64 (01 Jan 2021 10:03) (117/67 - 138/92)  BP(mean): --  RR: 18 (01 Jan 2021 10:03) (18 - 18)  SpO2: 99% (01 Jan 2021 10:03) (99% - 100%)    I&O's Summary    31 Dec 2020 07:01  -  01 Jan 2021 07:00  --------------------------------------------------------  IN: 2917 mL / OUT: 500 mL / NET: 2417 mL        CAPILLARY BLOOD GLUCOSE          PHYSICAL EXAM:  Constitutional: NAD, awake and alert, well-developed  HEENT: PERR, EOMI, Normal Hearing, MMM  Neck: Soft and supple, No LAD, No JVD  Respiratory: Breath sounds are clear bilaterally, No wheezing, rales or rhonchi  Cardiovascular: S1 and S2, regular rate and rhythm, no Murmurs, gallops or rubs  Gastrointestinal: Bowel Sounds present, soft, nontender, nondistended, no guarding, no rebound  Extremities: No peripheral edema  Vascular: 2+ peripheral pulses  Neurological: A/O x 3, no focal deficits  Musculoskeletal: 5/5 strength b/l upper and lower extremities  Skin: No rashes    MEDICATIONS:  MEDICATIONS  (STANDING):  folic acid 1 milliGRAM(s) Oral daily  influenza   Vaccine 0.5 milliLiter(s) IntraMuscular once  LORazepam   Injectable   IV Push   LORazepam   Injectable 1.5 milliGRAM(s) IV Push every 4 hours  melatonin 5 milliGRAM(s) Oral at bedtime  multivitamin 1 Tablet(s) Oral daily  pantoprazole Infusion 8 mG/Hr (10 mL/Hr) IV Continuous <Continuous>  sodium chloride 0.9%. 1000 milliLiter(s) (75 mL/Hr) IV Continuous <Continuous>  thiamine 100 milliGRAM(s) Oral daily      LABS: All Labs Reviewed:                        13.2   9.11  )-----------( 238      ( 31 Dec 2020 22:50 )             38.8     01-01    138  |  108  |  13  ----------------------------<  84  3.8   |  25  |  1.03    Ca    8.6      01 Jan 2021 07:38  Phos  3.7     01-01  Mg     2.1     01-01    TPro  6.7  /  Alb  3.2<L>  /  TBili  0.6  /  DBili  0.1  /  AST  59<H>  /  ALT  47  /  AlkPhos  64  12-31    PT/INR - ( 30 Dec 2020 19:03 )   PT: 11.3 sec;   INR: 0.97 ratio         PTT - ( 30 Dec 2020 19:03 )  PTT:30.8 sec      Physical Exam:   Reference Recent Physical Exam:

## 2021-01-01 NOTE — PROGRESS NOTE ADULT - SUBJECTIVE AND OBJECTIVE BOX
34 y/o M PMHx significant for alcohol dependency presents to  for further evaluation and management of c/o epigastric abdominal pain, associated with an episode of hematemesis around 5pm (12/30). The patient states that he drinks daily (approximately 6 beers) and has noted intermittent melenic bowel movements over the preceding 2 years. He states that he was scheduled for a surveillance colonoscopy and endoscopy with his Gastroenterologist, but never followed through. Labs => LA 2.8 -> 1.3, Hgb/Hct 16.3/46.4 -> 14.1/41.9. CT ABD/Pelvis w/ IV contrast => Small hiatal hernia. Question of mild colitis of the proximal ascending colon.     Patient seen today, feels well, no further nausea or vomiting. Reports no withdrawals from alcohol. Voiding well.   No chest pain or sob.       REVIEW OF SYSTEMS:  CONSTITUTIONAL: No weakness, fevers or chills  EYES/ENT: No visual changes;  No vertigo or throat pain   NECK: No pain or stiffness  RESPIRATORY: No cough, wheezing, hemoptysis; No shortness of breath  CARDIOVASCULAR: No chest pain or palpitations  GASTROINTESTINAL: No abdominal or epigastric pain. No nausea, vomiting, or hematemesis; No diarrhea or constipation. No melena or hematochezia.  GENITOURINARY: No dysuria, frequency or hematuria  NEUROLOGICAL: No numbness or weakness  SKIN: No itching, burning, rashes, or lesions   All other review of systems is negative unless indicated above    Vital Signs Last 24 Hrs  T(C): 36.3 (01 Jan 2021 10:03), Max: 36.8 (31 Dec 2020 21:23)  T(F): 97.4 (01 Jan 2021 10:03), Max: 98.2 (31 Dec 2020 21:23)  HR: 62 (01 Jan 2021 10:03) (53 - 77)  BP: 130/64 (01 Jan 2021 10:03) (117/67 - 138/92)  BP(mean): --  RR: 18 (01 Jan 2021 10:03) (18 - 18)  SpO2: 99% (01 Jan 2021 10:03) (99% - 100%)    I&O's Summary    31 Dec 2020 07:01  -  01 Jan 2021 07:00  --------------------------------------------------------  IN: 2917 mL / OUT: 500 mL / NET: 2417 mL        CAPILLARY BLOOD GLUCOSE          PHYSICAL EXAM:  Constitutional: NAD, awake and alert, well-developed  HEENT: PERR, EOMI, Normal Hearing, MMM  Neck: Soft and supple, No LAD, No JVD  Respiratory: Breath sounds are clear bilaterally, No wheezing, rales or rhonchi  Cardiovascular: S1 and S2, regular rate and rhythm, no Murmurs, gallops or rubs  Gastrointestinal: Bowel Sounds present, soft, nontender, nondistended, no guarding, no rebound  Extremities: No peripheral edema  Vascular: 2+ peripheral pulses  Neurological: A/O x 3, no focal deficits  Musculoskeletal: 5/5 strength b/l upper and lower extremities  Skin: No rashes    MEDICATIONS:  MEDICATIONS  (STANDING):  folic acid 1 milliGRAM(s) Oral daily  influenza   Vaccine 0.5 milliLiter(s) IntraMuscular once  LORazepam   Injectable   IV Push   LORazepam   Injectable 1.5 milliGRAM(s) IV Push every 4 hours  melatonin 5 milliGRAM(s) Oral at bedtime  multivitamin 1 Tablet(s) Oral daily  pantoprazole Infusion 8 mG/Hr (10 mL/Hr) IV Continuous <Continuous>  sodium chloride 0.9%. 1000 milliLiter(s) (75 mL/Hr) IV Continuous <Continuous>  thiamine 100 milliGRAM(s) Oral daily      LABS: All Labs Reviewed:                        13.2   9.11  )-----------( 238      ( 31 Dec 2020 22:50 )             38.8     01-01    138  |  108  |  13  ----------------------------<  84  3.8   |  25  |  1.03    Ca    8.6      01 Jan 2021 07:38  Phos  3.7     01-01  Mg     2.1     01-01    TPro  6.7  /  Alb  3.2<L>  /  TBili  0.6  /  DBili  0.1  /  AST  59<H>  /  ALT  47  /  AlkPhos  64  12-31    PT/INR - ( 30 Dec 2020 19:03 )   PT: 11.3 sec;   INR: 0.97 ratio         PTT - ( 30 Dec 2020 19:03 )  PTT:30.8 sec  
36 y/o M PMHx significant for alcohol dependency presents to  for further evaluation and management of c/o epigastric abdominal pain, associated with an episode of hematemesis around 5pm (12/30). The patient states that he drinks daily (approximately 6 beers) and has noted intermittent melenic bowel movements over the preceding 2 years. He states that he was scheduled for a surveillance colonoscopy and endoscopy with his Gastroenterologist, but never followed through. Labs => LA 2.8 -> 1.3, Hgb/Hct 16.3/46.4 -> 14.1/41.9. CT ABD/Pelvis w/ IV contrast => Small hiatal hernia. Question of mild colitis of the proximal ascending colon.     Patient seen today, some mild nausea and vomiting, with scant blood in vomitus. No chest pain or sob. No fever or cough.     REVIEW OF SYSTEMS:  CONSTITUTIONAL: No weakness, fevers or chills  EYES/ENT: No visual changes;  No vertigo or throat pain   NECK: No pain or stiffness  RESPIRATORY: No cough, wheezing, hemoptysis; No shortness of breath  CARDIOVASCULAR: No chest pain or palpitations  GASTROINTESTINAL: No abdominal or epigastric pain. No nausea, vomiting, or hematemesis; No diarrhea or constipation. No melena or hematochezia.  GENITOURINARY: No dysuria, frequency or hematuria  NEUROLOGICAL: No numbness or weakness  SKIN: No itching, burning, rashes, or lesions   All other review of systems is negative unless indicated above    Vital Signs Last 24 Hrs  T(C): 36.7 (31 Dec 2020 07:39), Max: 36.8 (30 Dec 2020 20:20)  T(F): 98.1 (31 Dec 2020 07:39), Max: 98.2 (30 Dec 2020 20:20)  HR: 76 (31 Dec 2020 07:39) (72 - 88)  BP: 132/68 (31 Dec 2020 07:39) (118/70 - 132/68)  BP(mean): --  RR: 18 (31 Dec 2020 07:39) (18 - 22)  SpO2: 98% (31 Dec 2020 07:39) (98% - 100%)    I&O's Summary      CAPILLARY BLOOD GLUCOSE          PHYSICAL EXAM:  Constitutional: NAD, awake and alert, well-developed  HEENT: PERR, EOMI, Normal Hearing, MMM  Neck: Soft and supple, No LAD, No JVD  Respiratory: Breath sounds are clear bilaterally, No wheezing, rales or rhonchi  Cardiovascular: S1 and S2, regular rate and rhythm, no Murmurs, gallops or rubs  Gastrointestinal: Bowel Sounds present, soft, nontender, nondistended, no guarding, no rebound  Extremities: No peripheral edema  Vascular: 2+ peripheral pulses  Neurological: A/O x 3, no focal deficits  Musculoskeletal: 5/5 strength b/l upper and lower extremities  Skin: No rashes    MEDICATIONS:  MEDICATIONS  (STANDING):  folic acid 1 milliGRAM(s) Oral daily  influenza   Vaccine 0.5 milliLiter(s) IntraMuscular once  LORazepam   Injectable   IV Push   LORazepam   Injectable 2 milliGRAM(s) IV Push every 4 hours  multivitamin 1 Tablet(s) Oral daily  pantoprazole Infusion 8 mG/Hr (10 mL/Hr) IV Continuous <Continuous>  sodium chloride 0.9%. 1000 milliLiter(s) (75 mL/Hr) IV Continuous <Continuous>  thiamine 100 milliGRAM(s) Oral daily      LABS: All Labs Reviewed:                        13.3   7.55  )-----------( 261      ( 31 Dec 2020 11:50 )             40.5     12-31    140  |  107  |  15  ----------------------------<  76  3.9   |  25  |  1.04    Ca    8.1<L>      31 Dec 2020 07:08  Phos  4.3     12-31  Mg     2.3     12-31    TPro  6.7  /  Alb  3.2<L>  /  TBili  0.6  /  DBili  0.1  /  AST  59<H>  /  ALT  47  /  AlkPhos  64  12-31    PT/INR - ( 30 Dec 2020 19:03 )   PT: 11.3 sec;   INR: 0.97 ratio         PTT - ( 30 Dec 2020 19:03 )  PTT:30.8 sec

## 2021-01-01 NOTE — PROGRESS NOTE ADULT - ASSESSMENT
· Assessment	  36 y/o M PMHx significant for alcohol dependency presents to  for further evaluation and management of c/o generalized abdominal pain, associated with an episode of hematemesis yesterday. The patient states that he drinks daily (approximately 6 beers) and has noted intermittent melenic bowel movements over the preceding 2 years. He states that he has followed up with his Gastroenterologist and has had surveillance colonoscopies and endoscopies since. Labs => LA 2.8 -> 1.3, Hgb/Hct 16.3/46.4 -> 14.1/41.9. CT ABD/Pelvis w/ IV contrast => Small hiatal hernia. Question of mild colitis of the proximal ascending colon.     #GI Bleeding  ~likely source; upper GI  ~cont. PPI gtt per protocol  ~serial CBCs  ~Type & Screen  ~transfuse prn  ~patient agreeable to stay for further evaluation   Fu with  Gastroenterology consultation in the am  ~NPO for now  ~cont. IV hydration  add zofran    #Alcohol Abuse  ~cont. CIWA protocol  ~cont. IV hydration    #Vte ppx  ~IMPROVE Vte Risk Score is 0  ~cont. SCDs for now
#GI Bleeding  Upper GI, likely gastritis, no further bleeding  CBC remain stable  Will switch to PPI upon discharge  advance diet, if tolerating will DC today  Fu with GI as outpatient and PCP    #Alcohol Abuse  PRN ativan, now symptom free  knows not to drink upon discharge    #Vte ppx  SCDs for now

## 2021-01-01 NOTE — DISCHARGE NOTE PROVIDER - NSDCMRMEDTOKEN_GEN_ALL_CORE_FT
folic acid 1 mg oral tablet: 1 tab(s) orally once a day  Multiple Vitamins oral tablet: 1 tab(s) orally once a day  omeprazole 40 mg oral delayed release capsule: 1 cap(s) orally once a day  thiamine 100 mg oral tablet: 1 tab(s) orally once a day

## 2021-01-05 ENCOUNTER — INPATIENT (INPATIENT)
Facility: HOSPITAL | Age: 36
LOS: 1 days | Discharge: PSYCHIATRIC OR SUBSTANCE USE FACILITY | DRG: 816 | End: 2021-01-07
Attending: HOSPITALIST | Admitting: INTERNAL MEDICINE
Payer: MEDICAID

## 2021-01-05 VITALS
OXYGEN SATURATION: 100 % | WEIGHT: 149.03 LBS | RESPIRATION RATE: 18 BRPM | TEMPERATURE: 98 F | HEART RATE: 100 BPM | SYSTOLIC BLOOD PRESSURE: 155 MMHG | DIASTOLIC BLOOD PRESSURE: 109 MMHG | HEIGHT: 66 IN

## 2021-01-05 LAB
BASE EXCESS BLDV CALC-SCNC: 1.6 MMOL/L — SIGNIFICANT CHANGE UP (ref -2–2)
HCO3 BLDV-SCNC: 23 MMOL/L — SIGNIFICANT CHANGE UP (ref 21–29)
PCO2 BLDV: 29 MMHG — LOW (ref 35–50)
PH BLDV: 7.51 — HIGH (ref 7.35–7.45)
PO2 BLDV: 96 MMHG — HIGH (ref 25–45)
SAO2 % BLDV: 98 % — HIGH (ref 67–88)

## 2021-01-05 PROCEDURE — 74019 RADEX ABDOMEN 2 VIEWS: CPT | Mod: 26

## 2021-01-05 PROCEDURE — 71048 X-RAY EXAM CHEST 4+ VIEWS: CPT | Mod: 26

## 2021-01-05 PROCEDURE — 93010 ELECTROCARDIOGRAM REPORT: CPT

## 2021-01-05 RX ORDER — FAMOTIDINE 10 MG/ML
20 INJECTION INTRAVENOUS ONCE
Refills: 0 | Status: COMPLETED | OUTPATIENT
Start: 2021-01-05 | End: 2021-01-05

## 2021-01-05 RX ORDER — CEFTRIAXONE 500 MG/1
1000 INJECTION, POWDER, FOR SOLUTION INTRAMUSCULAR; INTRAVENOUS ONCE
Refills: 0 | Status: COMPLETED | OUTPATIENT
Start: 2021-01-05 | End: 2021-01-05

## 2021-01-05 RX ORDER — PANTOPRAZOLE SODIUM 20 MG/1
80 TABLET, DELAYED RELEASE ORAL ONCE
Refills: 0 | Status: COMPLETED | OUTPATIENT
Start: 2021-01-05 | End: 2021-01-05

## 2021-01-05 RX ORDER — SODIUM CHLORIDE 9 MG/ML
1000 INJECTION INTRAMUSCULAR; INTRAVENOUS; SUBCUTANEOUS ONCE
Refills: 0 | Status: COMPLETED | OUTPATIENT
Start: 2021-01-05 | End: 2021-01-05

## 2021-01-05 RX ADMIN — SODIUM CHLORIDE 1000 MILLILITER(S): 9 INJECTION INTRAMUSCULAR; INTRAVENOUS; SUBCUTANEOUS at 23:43

## 2021-01-05 NOTE — ED ADULT TRIAGE NOTE - CHIEF COMPLAINT QUOTE
Pt BIBESM s/p suicidal attempt. As per EMS pt drank half a bottle of bleach, duexis x10 pills, cyclobenzaprine x12 pills, and alcohol. 1:1 initiated.

## 2021-01-06 DIAGNOSIS — F10.10 ALCOHOL ABUSE, UNCOMPLICATED: ICD-10-CM

## 2021-01-06 DIAGNOSIS — T54.92XA TOXIC EFFECT OF UNSPECIFIED CORROSIVE SUBSTANCE, INTENTIONAL SELF-HARM, INITIAL ENCOUNTER: ICD-10-CM

## 2021-01-06 DIAGNOSIS — K29.70 GASTRITIS, UNSPECIFIED, WITHOUT BLEEDING: ICD-10-CM

## 2021-01-06 DIAGNOSIS — F17.200 NICOTINE DEPENDENCE, UNSPECIFIED, UNCOMPLICATED: ICD-10-CM

## 2021-01-06 DIAGNOSIS — F19.94 OTHER PSYCHOACTIVE SUBSTANCE USE, UNSPECIFIED WITH PSYCHOACTIVE SUBSTANCE-INDUCED MOOD DISORDER: ICD-10-CM

## 2021-01-06 DIAGNOSIS — F32.9 MAJOR DEPRESSIVE DISORDER, SINGLE EPISODE, UNSPECIFIED: ICD-10-CM

## 2021-01-06 DIAGNOSIS — Z87.898 PERSONAL HISTORY OF OTHER SPECIFIED CONDITIONS: ICD-10-CM

## 2021-01-06 LAB
ALBUMIN SERPL ELPH-MCNC: 4.3 G/DL — SIGNIFICANT CHANGE UP (ref 3.3–5)
ALP SERPL-CCNC: 84 U/L — SIGNIFICANT CHANGE UP (ref 40–120)
ALT FLD-CCNC: 55 U/L — SIGNIFICANT CHANGE UP (ref 12–78)
ANION GAP SERPL CALC-SCNC: 11 MMOL/L — SIGNIFICANT CHANGE UP (ref 5–17)
APAP SERPL-MCNC: < 2 UG/ML (ref 10–30)
APPEARANCE UR: CLEAR — SIGNIFICANT CHANGE UP
APTT BLD: 34.1 SEC — SIGNIFICANT CHANGE UP (ref 27.5–35.5)
AST SERPL-CCNC: 65 U/L — HIGH (ref 15–37)
BASE EXCESS BLDA CALC-SCNC: -7 MMOL/L — LOW (ref -2–2)
BASOPHILS # BLD AUTO: 0.3 K/UL — HIGH (ref 0–0.2)
BASOPHILS NFR BLD AUTO: 3 % — HIGH (ref 0–2)
BILIRUB SERPL-MCNC: 0.4 MG/DL — SIGNIFICANT CHANGE UP (ref 0.2–1.2)
BILIRUB UR-MCNC: NEGATIVE — SIGNIFICANT CHANGE UP
BLOOD GAS COMMENTS ARTERIAL: SIGNIFICANT CHANGE UP
BUN SERPL-MCNC: 11 MG/DL — SIGNIFICANT CHANGE UP (ref 7–23)
CALCIUM SERPL-MCNC: 8.8 MG/DL — SIGNIFICANT CHANGE UP (ref 8.5–10.1)
CHLORIDE SERPL-SCNC: 106 MMOL/L — SIGNIFICANT CHANGE UP (ref 96–108)
CK SERPL-CCNC: 321 U/L — HIGH (ref 26–308)
CO2 SERPL-SCNC: 24 MMOL/L — SIGNIFICANT CHANGE UP (ref 22–31)
COLOR SPEC: YELLOW — SIGNIFICANT CHANGE UP
CREAT SERPL-MCNC: 1 MG/DL — SIGNIFICANT CHANGE UP (ref 0.5–1.3)
DIFF PNL FLD: NEGATIVE — SIGNIFICANT CHANGE UP
EOSINOPHIL # BLD AUTO: 0 K/UL — SIGNIFICANT CHANGE UP (ref 0–0.5)
EOSINOPHIL NFR BLD AUTO: 0 % — SIGNIFICANT CHANGE UP (ref 0–6)
ETHANOL SERPL-MCNC: 470 MG/DL — HIGH (ref 0–10)
GAS PNL BLDA: SIGNIFICANT CHANGE UP
GLUCOSE SERPL-MCNC: 74 MG/DL — SIGNIFICANT CHANGE UP (ref 70–99)
GLUCOSE UR QL: NEGATIVE MG/DL — SIGNIFICANT CHANGE UP
HCO3 BLDA-SCNC: 17 MMOL/L — LOW (ref 21–29)
HCT VFR BLD CALC: 49.3 % — SIGNIFICANT CHANGE UP (ref 39–50)
HGB BLD-MCNC: 16.9 G/DL — SIGNIFICANT CHANGE UP (ref 13–17)
INR BLD: 0.93 RATIO — SIGNIFICANT CHANGE UP (ref 0.88–1.16)
KETONES UR-MCNC: ABNORMAL
LACTATE SERPL-SCNC: 2 MMOL/L — SIGNIFICANT CHANGE UP (ref 0.7–2)
LACTATE SERPL-SCNC: 3.2 MMOL/L — HIGH (ref 0.7–2)
LEUKOCYTE ESTERASE UR-ACNC: NEGATIVE — SIGNIFICANT CHANGE UP
LIDOCAIN IGE QN: 170 U/L — SIGNIFICANT CHANGE UP (ref 73–393)
LYMPHOCYTES # BLD AUTO: 4.14 K/UL — HIGH (ref 1–3.3)
LYMPHOCYTES # BLD AUTO: 41 % — SIGNIFICANT CHANGE UP (ref 13–44)
MAGNESIUM SERPL-MCNC: 1.6 MG/DL — SIGNIFICANT CHANGE UP (ref 1.6–2.6)
MAGNESIUM SERPL-MCNC: 2.3 MG/DL — SIGNIFICANT CHANGE UP (ref 1.6–2.6)
MCHC RBC-ENTMCNC: 31.2 PG — SIGNIFICANT CHANGE UP (ref 27–34)
MCHC RBC-ENTMCNC: 34.3 GM/DL — SIGNIFICANT CHANGE UP (ref 32–36)
MCV RBC AUTO: 91.1 FL — SIGNIFICANT CHANGE UP (ref 80–100)
MONOCYTES # BLD AUTO: 0.5 K/UL — SIGNIFICANT CHANGE UP (ref 0–0.9)
MONOCYTES NFR BLD AUTO: 5 % — SIGNIFICANT CHANGE UP (ref 2–14)
NEUTROPHILS # BLD AUTO: 4.14 K/UL — SIGNIFICANT CHANGE UP (ref 1.8–7.4)
NEUTROPHILS NFR BLD AUTO: 41 % — LOW (ref 43–77)
NITRITE UR-MCNC: NEGATIVE — SIGNIFICANT CHANGE UP
NRBC # BLD: SIGNIFICANT CHANGE UP /100 WBCS (ref 0–0)
OSMOLALITY SERPL: 364 MOSMOL/KG — HIGH (ref 275–300)
PCO2 BLDA: 32 MMHG — SIGNIFICANT CHANGE UP (ref 32–46)
PCP SPEC-MCNC: SIGNIFICANT CHANGE UP
PH BLDA: 7.35 — SIGNIFICANT CHANGE UP (ref 7.35–7.45)
PH UR: 6 — SIGNIFICANT CHANGE UP (ref 5–8)
PHOSPHATE SERPL-MCNC: 3.9 MG/DL — SIGNIFICANT CHANGE UP (ref 2.5–4.5)
PLATELET # BLD AUTO: 324 K/UL — SIGNIFICANT CHANGE UP (ref 150–400)
PO2 BLDA: 96 MMHG — SIGNIFICANT CHANGE UP (ref 74–108)
POTASSIUM SERPL-MCNC: 3.8 MMOL/L — SIGNIFICANT CHANGE UP (ref 3.5–5.3)
POTASSIUM SERPL-SCNC: 3.8 MMOL/L — SIGNIFICANT CHANGE UP (ref 3.5–5.3)
PROT SERPL-MCNC: 8.9 GM/DL — HIGH (ref 6–8.3)
PROT UR-MCNC: NEGATIVE MG/DL — SIGNIFICANT CHANGE UP
PROTHROM AB SERPL-ACNC: 10.8 SEC — SIGNIFICANT CHANGE UP (ref 10.6–13.6)
RBC # BLD: 5.41 M/UL — SIGNIFICANT CHANGE UP (ref 4.2–5.8)
RBC # FLD: 13.9 % — SIGNIFICANT CHANGE UP (ref 10.3–14.5)
SALICYLATES SERPL-MCNC: <1.7 MG/DL — LOW (ref 2.8–20)
SAO2 % BLDA: 96 % — SIGNIFICANT CHANGE UP (ref 92–96)
SARS-COV-2 RNA SPEC QL NAA+PROBE: SIGNIFICANT CHANGE UP
SODIUM SERPL-SCNC: 141 MMOL/L — SIGNIFICANT CHANGE UP (ref 135–145)
SP GR SPEC: 1.01 — SIGNIFICANT CHANGE UP (ref 1.01–1.02)
UROBILINOGEN FLD QL: NEGATIVE MG/DL — SIGNIFICANT CHANGE UP
WBC # BLD: 10.09 K/UL — SIGNIFICANT CHANGE UP (ref 3.8–10.5)
WBC # FLD AUTO: 10.09 K/UL — SIGNIFICANT CHANGE UP (ref 3.8–10.5)

## 2021-01-06 PROCEDURE — 82803 BLOOD GASES ANY COMBINATION: CPT

## 2021-01-06 PROCEDURE — 83930 ASSAY OF BLOOD OSMOLALITY: CPT

## 2021-01-06 PROCEDURE — 80307 DRUG TEST PRSMV CHEM ANLYZR: CPT

## 2021-01-06 PROCEDURE — 99223 1ST HOSP IP/OBS HIGH 75: CPT

## 2021-01-06 PROCEDURE — 83605 ASSAY OF LACTIC ACID: CPT

## 2021-01-06 PROCEDURE — 84100 ASSAY OF PHOSPHORUS: CPT

## 2021-01-06 PROCEDURE — 36600 WITHDRAWAL OF ARTERIAL BLOOD: CPT

## 2021-01-06 PROCEDURE — 88305 TISSUE EXAM BY PATHOLOGIST: CPT

## 2021-01-06 PROCEDURE — 80053 COMPREHEN METABOLIC PANEL: CPT

## 2021-01-06 PROCEDURE — 36415 COLL VENOUS BLD VENIPUNCTURE: CPT

## 2021-01-06 PROCEDURE — 88305 TISSUE EXAM BY PATHOLOGIST: CPT | Mod: 26

## 2021-01-06 PROCEDURE — 88312 SPECIAL STAINS GROUP 1: CPT

## 2021-01-06 PROCEDURE — C9113: CPT

## 2021-01-06 PROCEDURE — 81003 URINALYSIS AUTO W/O SCOPE: CPT

## 2021-01-06 PROCEDURE — 88312 SPECIAL STAINS GROUP 1: CPT | Mod: 26

## 2021-01-06 PROCEDURE — 90792 PSYCH DIAG EVAL W/MED SRVCS: CPT

## 2021-01-06 PROCEDURE — 83735 ASSAY OF MAGNESIUM: CPT

## 2021-01-06 PROCEDURE — 85025 COMPLETE CBC W/AUTO DIFF WBC: CPT

## 2021-01-06 RX ORDER — SODIUM CHLORIDE 9 MG/ML
1000 INJECTION INTRAMUSCULAR; INTRAVENOUS; SUBCUTANEOUS
Refills: 0 | Status: DISCONTINUED | OUTPATIENT
Start: 2021-01-06 | End: 2021-01-07

## 2021-01-06 RX ORDER — PANTOPRAZOLE SODIUM 20 MG/1
40 TABLET, DELAYED RELEASE ORAL EVERY 12 HOURS
Refills: 0 | Status: DISCONTINUED | OUTPATIENT
Start: 2021-01-06 | End: 2021-01-06

## 2021-01-06 RX ORDER — ONDANSETRON 8 MG/1
4 TABLET, FILM COATED ORAL EVERY 6 HOURS
Refills: 0 | Status: DISCONTINUED | OUTPATIENT
Start: 2021-01-06 | End: 2021-01-07

## 2021-01-06 RX ORDER — PANTOPRAZOLE SODIUM 20 MG/1
40 TABLET, DELAYED RELEASE ORAL
Refills: 0 | Status: DISCONTINUED | OUTPATIENT
Start: 2021-01-06 | End: 2021-01-07

## 2021-01-06 RX ADMIN — SODIUM CHLORIDE 125 MILLILITER(S): 9 INJECTION INTRAMUSCULAR; INTRAVENOUS; SUBCUTANEOUS at 17:10

## 2021-01-06 RX ADMIN — PANTOPRAZOLE SODIUM 40 MILLIGRAM(S): 20 TABLET, DELAYED RELEASE ORAL at 11:49

## 2021-01-06 RX ADMIN — Medication 25 MILLIGRAM(S): at 17:10

## 2021-01-06 RX ADMIN — Medication 25 MILLIGRAM(S): at 20:26

## 2021-01-06 RX ADMIN — SODIUM CHLORIDE 125 MILLILITER(S): 9 INJECTION INTRAMUSCULAR; INTRAVENOUS; SUBCUTANEOUS at 21:36

## 2021-01-06 RX ADMIN — CEFTRIAXONE 1000 MILLIGRAM(S): 500 INJECTION, POWDER, FOR SOLUTION INTRAMUSCULAR; INTRAVENOUS at 00:03

## 2021-01-06 RX ADMIN — Medication 2 MILLIGRAM(S): at 12:33

## 2021-01-06 RX ADMIN — PANTOPRAZOLE SODIUM 80 MILLIGRAM(S): 20 TABLET, DELAYED RELEASE ORAL at 00:02

## 2021-01-06 RX ADMIN — SODIUM CHLORIDE 125 MILLILITER(S): 9 INJECTION INTRAMUSCULAR; INTRAVENOUS; SUBCUTANEOUS at 01:11

## 2021-01-06 RX ADMIN — Medication 2 MILLIGRAM(S): at 06:36

## 2021-01-06 RX ADMIN — FAMOTIDINE 20 MILLIGRAM(S): 10 INJECTION INTRAVENOUS at 00:02

## 2021-01-06 NOTE — H&P ADULT - NSICDXFAMHXPERTINENTNEGATIVE_GEN_A_CORE_FT
Mother and Father alive age 67 and 72 respectively with no reported medical history. Sister with no reported medical history

## 2021-01-06 NOTE — CONSULT NOTE ADULT - SUBJECTIVE AND OBJECTIVE BOX
GI consult    HPI:  35M with PMH of Depression and EtOH Abuse presents with suicidal ideation with suicide attempt with ingestion of excessive ibuprofen and a glass of bleach. Patient was drinking more than usual (normal 8-10 beers daily) with >10 beers and a bottle of vodka. Subsequent felt lonely and miserable and wanted to end his life/hurt himself and as such tooks ~10 tablets of ibuprofen and then a cup of bleach. Subsequently reported to the ED with nausua without vomiting. Had vomited last week with reported hematemesis. Associated dizziness and weakness. Denies fever, chills, headache, chest pain, SOB, cough, abdominal pain/discomfort, diarrhea, constipation. Patient reports weight loss that is unable to quantify. Trouble sleeping, anhedonia and constantly depressed daily for 6 years   In the ED HR , Afebrile, CBC, Coag, DBMP unremarkable, lactic acid 3.2, COVID-19 PCR negative, EtOH 470 and ABG 7.32/32/96 on RA.  Interviewed with [place] Czech interp today. No dysphagia currently. C/o hunger. Is tremulous, better with ativan. No further n/v or hematemesis. No melena.    PAST MEDICAL & SURGICAL HISTORY:  No pertinent past medical history    No significant past surgical history        Home Medications:      MEDICATIONS  (STANDING):  pantoprazole  Injectable 40 milliGRAM(s) IV Push every 12 hours  sodium chloride 0.9%. 1000 milliLiter(s) (125 mL/Hr) IV Continuous <Continuous>    MEDICATIONS  (PRN):  LORazepam     Tablet 2 milliGRAM(s) Oral every 2 hours PRN Symptom-triggered 2 point increase in CIWA-Ar  LORazepam     Tablet 2 milliGRAM(s) Oral every 1 hour PRN Symptom-triggered: each CIWA -Ar score 8 or GREATER  ondansetron Injectable 4 milliGRAM(s) IV Push every 6 hours PRN Nausea      Allergies    No Known Allergies    Intolerances        SOCIAL HISTORY: EtOH as above    FAMILY HISTORY:  No pertinent family history in first degree relatives        ROS  As above  Otherwise unremarkable, all systems reviewed    PE:  Vital Signs Last 24 Hrs  T(C): 37.1 (06 Jan 2021 08:37), Max: 37.1 (06 Jan 2021 04:29)  T(F): 98.8 (06 Jan 2021 08:37), Max: 98.8 (06 Jan 2021 08:37)  HR: 103 (06 Jan 2021 08:37) (92 - 103)  BP: 145/65 (06 Jan 2021 08:37) (116/66 - 155/109)  BP(mean): --  RR: 18 (06 Jan 2021 08:37) (18 - 18)  SpO2: 98% (06 Jan 2021 08:37) (97% - 100%)    Constitutional: NAD, well-developed, A+Ox3  tremulous  Anicteric   Respiratory: CTABL, breathing comfortably  Cardiovascular: S1 and S2, RRR  Gastrointestinal: +BS, soft, non tender, non distended, no mass  Extremities: warm, well perfused, no edema  Psychiatric: Normal mood, normal affect  Neuro: moves all extremities, grossly intact  Skin: No rashes or lesions    LABS:                        16.9   10.09 )-----------( 324      ( 05 Jan 2021 22:30 )             49.3     01-05    141  |  106  |  11  ----------------------------<  74  3.8   |  24  |  1.00    Ca    8.8      05 Jan 2021 22:30  Phos  3.9     01-06  Mg     1.6     01-06    TPro  8.9<H>  /  Alb  4.3  /  TBili  0.4  /  DBili  x   /  AST  65<H>  /  ALT  55  /  AlkPhos  84  01-05    PT/INR - ( 05 Jan 2021 22:30 )   PT: 10.8 sec;   INR: 0.93 ratio         PTT - ( 05 Jan 2021 22:30 )  PTT:34.1 sec  LIVER FUNCTIONS - ( 05 Jan 2021 22:30 )  Alb: 4.3 g/dL / Pro: 8.9 gm/dL / ALK PHOS: 84 U/L / ALT: 55 U/L / AST: 65 U/L / GGT: x             RADIOLOGY & ADDITIONAL STUDIES:

## 2021-01-06 NOTE — ED PROVIDER NOTE - NS ED ROS FT
Review of Systems:  	•	CONSTITUTIONAL: no fever  	•	SKIN: no rash  	•	RESPIRATORY: no shortness of breath  	•	CARDIAC: no chest pain  	•	GI:  no abd pain, no nausea, no vomiting, no diarrhea  	•	GENITO-URINARY:  no dysuria  	•	MUSCULOSKELETAL:  no back pain  	•	NEUROLOGIC: no weakness  	•	ALLERGY: no rhinorrhea  	•	PSYSCHIATRIC: SI

## 2021-01-06 NOTE — BEHAVIORAL HEALTH ASSESSMENT NOTE - NSBHCHARTREVIEWVS_PSY_A_CORE FT
Vital Signs Last 24 Hrs  T(C): 37.1 (06 Jan 2021 08:37), Max: 37.1 (06 Jan 2021 04:29)  T(F): 98.8 (06 Jan 2021 08:37), Max: 98.8 (06 Jan 2021 08:37)  HR: 103 (06 Jan 2021 08:37) (92 - 103)  BP: 145/65 (06 Jan 2021 08:37) (116/66 - 155/109)  BP(mean): --  RR: 18 (06 Jan 2021 08:37) (18 - 18)  SpO2: 98% (06 Jan 2021 08:37) (97% - 100%)

## 2021-01-06 NOTE — H&P ADULT - NSHPSOCIALHISTORY_GEN_ALL_CORE
Lives alone. Sister in [place] but doesn't communicate with her. Family in [place]. Lived in  x 2 years  Drinks 8-10 beers daily  2 Cigars Daily  Denies Illicit Drug Use

## 2021-01-06 NOTE — BEHAVIORAL HEALTH ASSESSMENT NOTE - HPI (INCLUDE ILLNESS QUALITY, SEVERITY, DURATION, TIMING, CONTEXT, MODIFYING FACTORS, ASSOCIATED SIGNS AND SYMPTOMS)
Pt is a 35 YOHM with hx of EtOH abuse denies past pscyh hx, who gets suicidal and "emotional" when intoxicated, last night was mixing wine and vodka and became emotional, depressed and took 0.5 bottle of bleach and 10 ibuprofen pills, but denies suicidal intent, saying "I wanted only to hurt myself, but not to die".   Pt admits to getting depressed when drinking, Drinks every day large beer 4-5 bottles and sometimes vodka. Admits to doing so last 2 years. No family here, Immigrated from [place] 7 years ago. Denies sleep and appetite problems  denies energy problems  denies being hopeless now. Denies current SI, HI, AH, VH, PI.   Admits to smoking cigarettes, denies other drugs.

## 2021-01-06 NOTE — BEHAVIORAL HEALTH ASSESSMENT NOTE - SUICIDE RISK FACTORS
Current mood episode/Alcohol/Substance abuse disorders/Access to lethal methods (pills, firearm, etc.: Ask specifically about presence or absence of a firearm in the home or ease of accessing/Impulsivity

## 2021-01-06 NOTE — ED PROVIDER NOTE - PHYSICAL EXAMINATION
*GEN: No acute distress, well appearing   *HEAD: Normocephalic, Atraumatic  *EYES/NOSE: b/l Pupils symmetric & Reactive to ligth, EOMI b/l  *THROAT: airway patent, moist mucous membranes, no burns but red throat  *NECK: Neck supple  *PULMONARY: No Respiratory distress, symmetric b/l chest rise  *CARDIAC: s1s2, regular rhythm   *ABDOMEN:  Non Tender, Non Distended, soft, no guarding, no rebound, no masses   *BACK: no CVA tenderness, No midline vertebral tenderness to palpation   *EXTREMITIES: symmetric pulses, 2+ DP & radial pulses, no cyanosis, no edema   *SKIN: no rash, no bruising   *NEUROLOGIC: alert,  full active & passive ROM in all 4 extremities, normal gait  *PSYCH: intoxicated, keeps repeating "I want death"

## 2021-01-06 NOTE — PATIENT PROFILE ADULT - PATIENT/CAREGIVER OFFERED  INTERPRETER SERVICES
Post-Departure Follow-Up


dr martinez and oj kilgore faxed formal report of pelvic us for fu mlg Lundborg-Gray,Maja MD           Dec 2, 2019 12:55
yes

## 2021-01-06 NOTE — BEHAVIORAL HEALTH ASSESSMENT NOTE - NSBHCHARTREVIEWLAB_PSY_A_CORE FT
16.9   10.09 )-----------( 324      ( 05 Jan 2021 22:30 )             49.3   01-05    141  |  106  |  11  ----------------------------<  74  3.8   |  24  |  1.00    Ca    8.8      05 Jan 2021 22:30  Phos  3.9     01-06  Mg     1.6     01-06    TPro  8.9<H>  /  Alb  4.3  /  TBili  0.4  /  DBili  x   /  AST  65<H>  /  ALT  55  /  AlkPhos  84  01-05

## 2021-01-06 NOTE — H&P ADULT - ASSESSMENT
pantoprazole  Injectable: [Ordered as PROTONIX  Injectable]  40 milliGRAM(s), IV Push, every 12 hours  Special Instructions: s/p Bleach Ingestion; Reported Hematemersis. (01-06-21 @ 09:13)  Phosphorus Level, Serum: AM Sched. Collection: 07-Jan-2021 07:00 (01-06-21 @ 09:12)  Magnesium, Serum: AM Sched. Collection: 07-Jan-2021 07:00 (01-06-21 @ 09:12)  Comprehensive Metabolic Panel: AM Sched. Collection: 07-Jan-2021 07:00 (01-06-21 @ 09:12)  Complete Blood Count + Automated Diff: AM Sched. Collection: 07-Jan-2021 07:00 (01-06-21 @ 09:12)  Diet, NPO:   Except Medications  With Ice Chips/Sips of Water (01-06-21 @ 09:11)  Constant Observation Adult Behavioral Health:     Indications: (01-06-21 @ 09:11)  Phosphorus Level, Serum: 12:00 (01-06-21 @ 09:11)  Magnesium, Serum: 12:00 (01-06-21 @ 09:11)  Lactate, Blood: 12:00 (01-06-21 @ 09:11)  ondansetron Injectable: [Known as ZOFRAN Injectable]  4 milliGRAM(s), IV Push, every 6 hours, PRN for Nausea  Administration Instructions: Max IV Push dose 8 milliGRAM(s)  IF IV PUSH, ADMINISTER OVER 2-5 MIN (01-06-21 @ 09:11)  Activity - Ambulate as Tolerated:     Time/Priority:  Routine (01-06-21 @ 09:11)  Vital Signs:     Frequency:  every 4 hours (01-06-21 @ 09:11)  Weight:     Frequency:  on admission (01-06-21 @ 09:11)  Height/Length:     Frequency:  on admission (01-06-21 @ 09:11)  Intermittent Pneumatic Compression:     Body Side:  Bilateral    Additional Instructions:  Apply device now and remove only for bathing and skin evaluation as per nursing protocol.  Document application and removal of Intermittent Pneumatic Compression devices on flow sheet (01-06-21 @ 09:11)  No Pharmacologic VTE Prophylaxis - Due To::     Time/Priority:  Routine    Reason For No Pharmacologic VTE Prophylaxis  Reported Hematemesis (01-06-21 @ 09:11)  Admit to Inpatient Level of Care:     Service:  MED    Physician:  Zheng (01-06-21 @ 09:11)  LORazepam   Injectable: [Ordered as ATIVAN Injectable]  2 milliGRAM(s), IV Push, once, Stop After 1 Doses  Administration Instructions: This is a Look-alike/Sound-alike Medication  Provider's Contact #: [phone] (01-06-21 @ 05:25)  Osmolality, Serum: STAT (01-06-21 @ 01:42)  Blood Gas Profile - Arterial: STAT (01-06-21 @ 01:41)  Drug Screen W/PCP, Urine: Routine (01-06-21 @ 01:39)  Admit from ED (01-06-21 @ 01:25)  Manual Differential: 22:30 (01-06-21 @ 01:19)  Admit to Inpatient Level of Care:     Service:  TELE    Bed/Unit Type:  TELEMETRY    Isolation:  None    Physician:  Hedjar, Aryles    Special Considerations:  None;Low Suspicion of COVID-19    Diagnosis:  Ingestion of bleach, intentional self-harm, initial encounter; Drug overdose; Suicidal ideation; Metabolic alkalosis    Additional Instructions:  Diagnosis: Ingestion of bleach, intentional self-harm, initial encounter; Drug overdose; Suicidal ideation; Metabolic alkalosis (01-06-21 @ 01:14)  Bed Request:   CLEAN    Service:  TELE    Bed/Unit Type:  TELEMETRY    Isolation:  None    Special Considerations:  None;Low Suspicion of COVID-19    Diagnosis:  Ingestion of bleach, intentional self-harm, initial encounter; Drug overdose; Suicidal ideation; Metabolic alkalosis (01-06-21 @ 01:14)  Activity - Ambulate as Tolerated:     Time/Priority:  Routine (01-06-21 @ 00:57)  sodium chloride 0.9%.: Solution, 1000 milliLiter(s) infuse at 125 mL/Hr, Stop After 12 Hours (01-06-21 @ 00:57)  Vital Signs:     Frequency:  per routine (01-06-21 @ 00:57)  Lactate, Blood: 02:37 (01-06-21 @ 00:05)  Antibody Screen Interpretation: 23:37 (01-05-21 @ 23:45)  cefTRIAXone Injectable.: [Known as ROCEPHIN Injectable.]  1000 milliGRAM(s), IV Push, Once, Stop After 1 Doses  Indication: Empiric dosing  Administration Instructions: ADMINISTER OVER 5 MINUTES  MAX IV PUSH DOSE 2,000 milliGRAM(s)  Reconstitute (each) 1,000 mG vial with 10 mL Sterile Water  Reconstitute (each) 2,000 mG vial with 10 mL Sterile Water  This is a Look-alike/Sound-alike Medication  Provider's Contact #: [phone]     (Adult Calc Info: Calculation : (1,000 mG Flat Dose)  Calculated Dose : 1,000 mG) (01-05-21 @ 23:44)  Diet, NPO (01-05-21 @ 23:38)  Consult- Gastroenterology:    Reason for Consult: bleach ingestion   Team Name: [facility], Team (01-05-21 @ 23:37)  Xray Chest PA + Lateral + Oblique Bilat 4 Views: Urgent   Indication: bleach ingestion, need at least 3 views  Transport: Stretcher-Crib  Exam Completed  Provider's Contact #: [phone] (01-05-21 @ 23:34)  famotidine Injectable: [Ordered as PEPCID Injectable]  20 milliGRAM(s), IV Push, once, Stop After 1 Doses  Administration Instructions: Dilute to a total volume of 5 or 10 mL and push over 2 minutes  Refrigerate  Provider's Contact #: [phone] (01-05-21 @ 23:33)  pantoprazole  Injectable: [Ordered as PROTONIX  Injectable]  80 milliGRAM(s), IV Push, Once, Stop After 1 Doses  Provider's Contact #: [phone] (01-05-21 @ 23:33)  sodium chloride 0.9% Bolus:   1000 milliLiter(s), IV Bolus, once, infuse over 60 Minute(s), Stop After 1 Doses  Provider's Contact #: [phone] (01-05-21 @ 23:33)  Xray Abdomen 2 Views: Urgent   Indication: bleach ingestion  Transport: Stretcher-Crib  Exam Completed  Provider's Contact #: [phone] (01-05-21 @ 23:33)  Constant Observation:     Time/Priority:  STAT (01-05-21 @ 23:33)  Lactate, Blood: STAT (01-05-21 @ 23:33)  Consult- Psychiatry, Adult:    Reason for Consult: si, bleach ingestion   Team Name: T Dept. of Psychiatry, Team (01-05-21 @ 23:29)  12 Lead ECG:   Provider's Contact #: [phone] (01-05-21 @ 23:29)  Vital Signs:     Frequency:  per routine (01-05-21 @ 23:29)  Xray Chest 1 View- PORTABLE-Urgent: Urgent   Indication: bleach ingestion  Transport: Portable  Provider's Contact #: [phone] (01-05-21 @ 23:29)  Urinalysis: STAT  Cancel Reason: Dup Cancel (01-05-21 @ 23:29)  COVID-19  Antibody - for prior infection screening: STAT (01-05-21 @ 23:29)  COVID-19 PCR: STAT  Specimen Source: Nasopharyngeal (01-05-21 @ 23:29)  Urinalysis: STAT (01-05-21 @ 23:29)  Drug Screen W/PCP, Urine: STAT (01-05-21 @ 23:29)  Blood Gas Profile - Venous: STAT (01-05-21 @ 23:29)  Salicylate Level, Serum: STAT (01-05-21 @ 23:29)  Alcohol, Blood: STAT (01-05-21 @ 23:29)  Acetaminophen Level, Serum: STAT (01-05-21 @ 23:29)  Magnesium, Serum: STAT (01-05-21 @ 23:29)  Lipase, Serum: STAT (01-05-21 @ 23:29)  Creatine Kinase, Serum: STAT (01-05-21 @ 23:29)  Type + Screen: STAT (01-05-21 @ 23:29)  Activated Partial Thromboplastin Time:  Start Date:05-Jan-2021. STAT (01-05-21 @ 23:29)  Prothrombin Time and INR, Plasma:  Start Date:05-Jan-2021. STAT (01-05-21 @ 23:29)  Comprehensive Metabolic Panel: STAT (01-05-21 @ 23:29)  Complete Blood Count + Automated Diff: STAT (01-05-21 @ 23:29)      ASSESSMENT    Major Depression Severe/Recurrent with Suicide Attempt with EtOH, Ibuprofen and Ingestion of Bleach  Associated Lactic Acidosis due to Above  Reported remote episode of Hematemesis 1 week prior to admission. No overt signs of bleeding POA  EtOH Abuse/Intoxication    PLAN    Telemetry  CIWA protocol  NPO  IV PPI  GI consulted.   Hgb stable. COntinue to monitor  IVF as needed for fluid balance  Folate/Thiamine/Multivitamin  Continue to follow lactic acid  Monitor electrolytes and correct accordingly .  Psychiatry consulted. Maintain 1:1 Observation    DVT Prophylaxis: ICDs. Low risk plus reported recent hematemesis.

## 2021-01-06 NOTE — PROGRESS NOTE ADULT - SUBJECTIVE AND OBJECTIVE BOX
EGD performed, no esophageal injury    PPI daily  resume diet  will follow prn, please call if needed thank you

## 2021-01-06 NOTE — H&P ADULT - NSHPREVIEWOFSYSTEMS_GEN_ALL_CORE
Review of Systems: 14 Point review of systems reviewed and reported as negative unless otherwise stated in HPI

## 2021-01-06 NOTE — H&P ADULT - HISTORY OF PRESENT ILLNESS
35M with PMH of Depression and EtOH Abuse presents with suicidal ideation with suicide attempt with ingestion of excessive ibuprofen and a glass of bleach. Patient was drinking more than usual (normal 8-10 beers daily) with >10 beers and a bottle of vodka. Subsequent felt lonely and miserable and wanted to end his life/hurt himself and as such tooks ~10 tablets of ibuprofen and then a cup of bleach. Subsequently  35M with PMH of Depression and EtOH Abuse presents with suicidal ideation with suicide attempt with ingestion of excessive ibuprofen and a glass of bleach. Patient was drinking more than usual (normal 8-10 beers daily) with >10 beers and a bottle of vodka. Subsequent felt lonely and miserable and wanted to end his life/hurt himself and as such tooks ~10 tablets of ibuprofen and then a cup of bleach. Subsequently reported to the ED with nausua without vomiting. Had vomited last week with reported hematemesis. Associated dizziness and weakness. Denies fever, chills, headache, chest pain, SOB, cough, abdominal pain/discomfort, diarrhea, constipation. Patient reports weight loss that is unable to quantify. Trouble sleeping, anhedonia and constantly depressed daily for 6 years     In the ED HR , Afebrile, CBC, Coag, DBMP unremarkable, lactic acid 3.2, COVID-19 PCR negative, EtOH 470 and ABG 7.32/32/96 on RA

## 2021-01-06 NOTE — BEHAVIORAL HEALTH ASSESSMENT NOTE - RISK ASSESSMENT
HIGH ACUTE risk: Pt is s/p SA while intoxicated , depressed, EtOH abuse  INCREASED LONG TERM RISK: EtOH abuse, mood disorders, SI when intoxicated, hx of SA, hx of family positive for completed suicide  Protective factors. Plans for future and employed  Mitigation: inpatient psychiatry treatment, meds, safety plans High Acute Suicide Risk

## 2021-01-06 NOTE — ED ADULT NURSE REASSESSMENT NOTE - NS ED NURSE REASSESS COMMENT FT1
Call placed to MD Goode, pt with visible shakes and complaints of feeling "shaky" and states his head was spinning.  Pt stated he usually drink 10 beers a day and yesterday drank a bottle of vodka and two bottles of wine.  MD Goode placed orders for PRN ativan.  Pt medicated accordingly.

## 2021-01-06 NOTE — ED PROVIDER NOTE - OBJECTIVE STATEMENT
Pertinent HPI/PMH/PSH/FHx/SHx and Review of Systems contained within  HPI:  Patient bibems CC Pt BIBESM s/p suicidal attempt. As per EMS pt drank half a bottle of bleach around noon today, duexis x10 pills, cyclobenzaprine x12 pills, and alcohol. pt states he vomitted blood. no complaints except keeps repeating "I want death"  PMH/PSH relevant for: none  ROS negative for: fever, Chest pain, SOB, Nausea, vomiting, diarrhea, abdominal pain,   FamilyHx and SocialHx not otherwise contributory

## 2021-01-06 NOTE — ED ADULT NURSE NOTE - OBJECTIVE STATEMENT
Pt BIBA s/p suicidal attempts. Pt states he drank half a bottle of household bleach, vodka and took multiple pills, unknown amount.  Pt states he just wants to die.  Pt A7Ox3, 1:1 in place for safety.

## 2021-01-06 NOTE — ED PROVIDER NOTE - CLINICAL SUMMARY MEDICAL DECISION MAKING FREE TEXT BOX
Pt BIBESM s/p suicidal attempt. As per EMS pt drank half a bottle of bleach around noon, duexis x10 pills, cyclobenzaprine x12 pills, and alcohol. normal vitals & physical exam. had hemetemesis but no current cp nor abdominal pain. spoke to dr burgos who'll scope pt in am

## 2021-01-06 NOTE — H&P ADULT - NSHPPHYSICALEXAM_GEN_ALL_CORE
PHYSICAL EXAM:    T(C): 37.1 (01-06-21 @ 08:37), Max: 37.1 (01-06-21 @ 04:29)  HR: 103 (01-06-21 @ 08:37) (92 - 103)  BP: 145/65 (01-06-21 @ 08:37) (116/66 - 155/109)  RR: 18 (01-06-21 @ 08:37) (18 - 18)  SpO2: 98% (01-06-21 @ 08:37) (97% - 100%)      General: AAOx3; NAD  Head: AT/NC  ENT: Moist Mucous Membranes; No Injury  Neck: Non-tender; No JVD  CVS: RRR, S1&S2, No murmur, No edema  Respiratory: Lungs CTA B/L; Normal Respiratory Effort  Abdomen/GI: Soft, non-tender, non-distended, no guarding, no rebound, normal bowel sounds  : No bladder distention, No Cade  Extremites: No cyanosis, No clubbing, No edema  MSK: No CVA tenderness, Normal ROM, No injury  Neuro: AAOx3, CNII-XII grossly intact, non-focal  Psych: Appropriate, Cooperative, Depressed; Denies suicidal ideation on presentation; confirmed suicidal ideation present yesterday.   Skin: Clean, Dry and Intact

## 2021-01-06 NOTE — ED PROVIDER NOTE - CARE PLAN
Principal Discharge DX:	Ingestion of bleach, intentional self-harm, initial encounter  Secondary Diagnosis:	Drug overdose  Secondary Diagnosis:	Suicidal ideation  Secondary Diagnosis:	Metabolic alkalosis

## 2021-01-06 NOTE — H&P ADULT - NSHPLABSRESULTS_GEN_ALL_CORE
Blood Gas Profile - Arterial (01.06.21 @ 05:36)   pH, Arterial: 7.35   pCO2, Arterial: 32 mmHg   pO2, Arterial: 96 mmHg   HCO3, Arterial: 17 mmoL/L   Base Excess, Arterial: -7.0 mmol/L   Oxygen Saturation, Arterial: 96 %   Blood Gas Comments Arterial: FIO2:_ MODE:_ VT:_ RATE:_ PEEP:_ Comment:room air   Blood Gas Source Arterial: Arterial Lactate, Blood (01.06.21 @ 03:21)   Lactate, Blood: 3.0: Elevated lactate. Consider ordering follow-up lactate to trend. mmol/L       Historical Values  Lactate, Blood (01.06.21 @ 03:21)   Lactate, Blood: 3.0: Elevated lactate. Consider ordering follow-up lactate to trend. mmol/L   Lactate, Blood (01.05.21 @ 23:37)   Lactate, Blood: 3.2: Elevated lactate. Consider ordering follow-up lactate to trend. mmol/LOsmolality, Serum (01.06.21 @ 03:21)   Osmolality, Serum: 364 mosmol/kg Antibody Screen Interpretation (01.05.21 @ 23:37)   Antibody Screen: NEG COVID-19 PCR . (01.05.21 @ 22:30)   COVID-19 PCR: NotDetec: You can help in the fight against COVID-19. [facility] may contact   you to see if you are interested in voluntarily participating in one of   our clinical trials. Alcohol, Blood (01.05.21 @ 22:30)   Alcohol, Blood: 470: TOXIC CONCENTRATIONS (mg/dL): Magnesium, Serum: 2.3 mg/dL Lipase, Serum (01.05.21 @ 22:30)   Lipase, Serum: 170 U/L Creatine Kinase, Serum: 321 U/L (01.05.21 @ 22:30) Comprehensive Metabolic Panel (01.05.21 @ 22:30)   Sodium, Serum: 141 mmol/L   Potassium, Serum: 3.8 mmol/L   Chloride, Serum: 106 mmol/L   Carbon Dioxide, Serum: 24 mmol/L   Anion Gap, Serum: 11 mmol/L   Blood Urea Nitrogen, Serum: 11 mg/dL   Creatinine, Serum: 1.00 mg/dL   Glucose, Serum: 74 mg/dL   Calcium, Total Serum: 8.8 mg/dL   Protein Total, Serum: 8.9 gm/dL   Albumin, Serum: 4.3 g/dL   Bilirubin Total, Serum: 0.4 mg/dL   Alkaline Phosphatase, Serum: 84 U/L   Aspartate Aminotransferase (AST/SGOT): 65 U/L   Alanine Aminotransferase (ALT/SGPT): 55 U/L Complete Blood Count + Automated Diff (01.05.21 @ 22:30)   WBC Count: 10.09 K/uL   RBC Count: 5.41 M/uL   Hemoglobin: 16.9 g/dL   Hematocrit: 49.3 %   Mean Cell Volume: 91.1 fl   Mean Cell Hemoglobin: 31.2 pg   Mean Cell Hemoglobin Conc: 34.3 gm/dL   Red Cell Distrib Width: 13.9 %   Platelet Count - Automated: 324 K/uL   Auto Neutrophil #: 4.14 K/uL   Auto Lymphocyte #: 4.14 K/uL   Auto Monocyte #: 0.50 K/uL   Auto Eosinophil #: 0.00 K/uL   Auto Basophil #: 0.30 K/uL   Auto Neutrophil %: 41.0: Differential percentages must be correlated with absolute numbers for   clinical significance. % < from: Xray Chest PA + Lateral + Oblique Bilat 4 Views (01.05.21 @ 23:54) >    EXAM:  XR CHEST PA LAT W OBLIQUES 4V                            PROCEDURE DATE:  01/05/2021          INTERPRETATION:  Clinical information: Bleach ingestion.    TECHNIQUE: PA and bilateral oblique views of the chest.    COMPARISON: Prior chest x-ray study from 5/16/2019.    FINDINGS: The lungs are clear. The cardiomediastinal silhouette is normal. The visualized osseous structures are unremarkable.    IMPRESSION: Clear lungs, unchanged.    < end of copied text >    Reviewed labs, vitals and imaging.

## 2021-01-06 NOTE — CONSULT NOTE ADULT - ASSESSMENT
35M EtoH abuse with suicide attempt by drinking bleach.  Hematemesis reported at home.    Rec:  -EGD to r/o esophageal injury  -the risks, benefits, and alternatives of upper endoscopy were discussed with the patient. We specifically discussed the risk of bleeding, infection, perforation, sore throat. All questions were answered and the patient/proxy agree to proceed with the procedure.  -PPI

## 2021-01-06 NOTE — H&P ADULT - NSICDXNOFAMILYHX_GEN_ALL_CORE
Attempted to reach pt via phone.     Could try Rozerem 8 mg qhs, if that is not covered could try Lunesta 1 mg qhs.    Pharmacy?    Left message to call back.    <-- Click to add NO pertinent Family History

## 2021-01-06 NOTE — BEHAVIORAL HEALTH ASSESSMENT NOTE - SUMMARY
Pt is a 35 YOHM with hx of EtOH abuse denies past pscyh hx, who gets suicidal and "emotional" when intoxicated, last night was mixing wine and vodka and became emotional, depressed and took 0.5 bottle of bleach and 10 ibuprofen pills, but denies suicidal intent, saying "I wanted only to hurt myself, but not to die".   Pt admits to getting depressed when drinking, Drinks every day large beer 4-5 bottles and sometimes vodka. Admits to doing so last 2 years. No family here, Immigrated from [place] 7 years ago. Denies sleep and appetite problems  denies energy problems  denies being hopeless now. Denies current SI, HI, AH, VH, PI.   Admits to smoking cigarettes, denies other drugs.  Pt is at high suicide risk and needs inpatient psychiatry level of care.   Once medically cleared and stabilised would transfer to [facility] if bed available.

## 2021-01-06 NOTE — ED ADULT NURSE REASSESSMENT NOTE - NS ED NURSE REASSESS COMMENT FT1
Pt with no complaints at this time.  1:1 at the bedside. All safety being maintained. 1:1 sheet signed and updated. Will ctm.

## 2021-01-07 ENCOUNTER — INPATIENT (INPATIENT)
Facility: HOSPITAL | Age: 36
LOS: 3 days | Discharge: PSYCHIATRIC OR SUBSTANCE USE FACILITY | DRG: 751 | End: 2021-01-11
Attending: PSYCHIATRY & NEUROLOGY | Admitting: PSYCHIATRY & NEUROLOGY
Payer: MEDICAID

## 2021-01-07 VITALS
WEIGHT: 167.99 LBS | OXYGEN SATURATION: 100 % | HEIGHT: 68 IN | RESPIRATION RATE: 18 BRPM | TEMPERATURE: 97 F | HEART RATE: 86 BPM

## 2021-01-07 VITALS
OXYGEN SATURATION: 97 % | RESPIRATION RATE: 20 BRPM | DIASTOLIC BLOOD PRESSURE: 104 MMHG | TEMPERATURE: 98 F | SYSTOLIC BLOOD PRESSURE: 144 MMHG | HEART RATE: 74 BPM

## 2021-01-07 DIAGNOSIS — F33.2 MAJOR DEPRESSIVE DISORDER, RECURRENT SEVERE WITHOUT PSYCHOTIC FEATURES: ICD-10-CM

## 2021-01-07 DIAGNOSIS — F32.9 MAJOR DEPRESSIVE DISORDER, SINGLE EPISODE, UNSPECIFIED: ICD-10-CM

## 2021-01-07 LAB
ALBUMIN SERPL ELPH-MCNC: 3.4 G/DL — SIGNIFICANT CHANGE UP (ref 3.3–5)
ALP SERPL-CCNC: 64 U/L — SIGNIFICANT CHANGE UP (ref 40–120)
ALT FLD-CCNC: 39 U/L — SIGNIFICANT CHANGE UP (ref 12–78)
ANION GAP SERPL CALC-SCNC: 4 MMOL/L — LOW (ref 5–17)
AST SERPL-CCNC: 40 U/L — HIGH (ref 15–37)
BASOPHILS # BLD AUTO: 0.1 K/UL — SIGNIFICANT CHANGE UP (ref 0–0.2)
BASOPHILS NFR BLD AUTO: 1.2 % — SIGNIFICANT CHANGE UP (ref 0–2)
BILIRUB SERPL-MCNC: 1 MG/DL — SIGNIFICANT CHANGE UP (ref 0.2–1.2)
BUN SERPL-MCNC: 14 MG/DL — SIGNIFICANT CHANGE UP (ref 7–23)
CALCIUM SERPL-MCNC: 8.7 MG/DL — SIGNIFICANT CHANGE UP (ref 8.5–10.1)
CHLORIDE SERPL-SCNC: 104 MMOL/L — SIGNIFICANT CHANGE UP (ref 96–108)
CO2 SERPL-SCNC: 29 MMOL/L — SIGNIFICANT CHANGE UP (ref 22–31)
CREAT SERPL-MCNC: 0.98 MG/DL — SIGNIFICANT CHANGE UP (ref 0.5–1.3)
EOSINOPHIL # BLD AUTO: 0.11 K/UL — SIGNIFICANT CHANGE UP (ref 0–0.5)
EOSINOPHIL NFR BLD AUTO: 1.3 % — SIGNIFICANT CHANGE UP (ref 0–6)
GLUCOSE SERPL-MCNC: 102 MG/DL — HIGH (ref 70–99)
HCT VFR BLD CALC: 38.6 % — LOW (ref 39–50)
HGB BLD-MCNC: 12.9 G/DL — LOW (ref 13–17)
IMM GRANULOCYTES NFR BLD AUTO: 0.2 % — SIGNIFICANT CHANGE UP (ref 0–1.5)
LYMPHOCYTES # BLD AUTO: 2.94 K/UL — SIGNIFICANT CHANGE UP (ref 1–3.3)
LYMPHOCYTES # BLD AUTO: 34.6 % — SIGNIFICANT CHANGE UP (ref 13–44)
MAGNESIUM SERPL-MCNC: 1.9 MG/DL — SIGNIFICANT CHANGE UP (ref 1.6–2.6)
MCHC RBC-ENTMCNC: 31.5 PG — SIGNIFICANT CHANGE UP (ref 27–34)
MCHC RBC-ENTMCNC: 33.4 GM/DL — SIGNIFICANT CHANGE UP (ref 32–36)
MCV RBC AUTO: 94.1 FL — SIGNIFICANT CHANGE UP (ref 80–100)
MONOCYTES # BLD AUTO: 1.04 K/UL — HIGH (ref 0–0.9)
MONOCYTES NFR BLD AUTO: 12.2 % — SIGNIFICANT CHANGE UP (ref 2–14)
NEUTROPHILS # BLD AUTO: 4.29 K/UL — SIGNIFICANT CHANGE UP (ref 1.8–7.4)
NEUTROPHILS NFR BLD AUTO: 50.5 % — SIGNIFICANT CHANGE UP (ref 43–77)
PHOSPHATE SERPL-MCNC: 2.9 MG/DL — SIGNIFICANT CHANGE UP (ref 2.5–4.5)
PLATELET # BLD AUTO: 255 K/UL — SIGNIFICANT CHANGE UP (ref 150–400)
POTASSIUM SERPL-MCNC: 3.6 MMOL/L — SIGNIFICANT CHANGE UP (ref 3.5–5.3)
POTASSIUM SERPL-SCNC: 3.6 MMOL/L — SIGNIFICANT CHANGE UP (ref 3.5–5.3)
PROT SERPL-MCNC: 6.7 GM/DL — SIGNIFICANT CHANGE UP (ref 6–8.3)
RBC # BLD: 4.1 M/UL — LOW (ref 4.2–5.8)
RBC # FLD: 13.7 % — SIGNIFICANT CHANGE UP (ref 10.3–14.5)
SODIUM SERPL-SCNC: 137 MMOL/L — SIGNIFICANT CHANGE UP (ref 135–145)
WBC # BLD: 8.5 K/UL — SIGNIFICANT CHANGE UP (ref 3.8–10.5)
WBC # FLD AUTO: 8.5 K/UL — SIGNIFICANT CHANGE UP (ref 3.8–10.5)

## 2021-01-07 PROCEDURE — 80048 BASIC METABOLIC PNL TOTAL CA: CPT

## 2021-01-07 PROCEDURE — U0005: CPT

## 2021-01-07 PROCEDURE — 99232 SBSQ HOSP IP/OBS MODERATE 35: CPT

## 2021-01-07 PROCEDURE — 99239 HOSP IP/OBS DSCHRG MGMT >30: CPT

## 2021-01-07 PROCEDURE — 36415 COLL VENOUS BLD VENIPUNCTURE: CPT

## 2021-01-07 PROCEDURE — 84100 ASSAY OF PHOSPHORUS: CPT

## 2021-01-07 PROCEDURE — 80061 LIPID PANEL: CPT

## 2021-01-07 PROCEDURE — U0003: CPT

## 2021-01-07 PROCEDURE — 83735 ASSAY OF MAGNESIUM: CPT

## 2021-01-07 PROCEDURE — 83036 HEMOGLOBIN GLYCOSYLATED A1C: CPT

## 2021-01-07 PROCEDURE — 80076 HEPATIC FUNCTION PANEL: CPT

## 2021-01-07 PROCEDURE — 84443 ASSAY THYROID STIM HORMONE: CPT

## 2021-01-07 RX ORDER — HALOPERIDOL DECANOATE 100 MG/ML
5 INJECTION INTRAMUSCULAR ONCE
Refills: 0 | Status: DISCONTINUED | OUTPATIENT
Start: 2021-01-07 | End: 2021-01-08

## 2021-01-07 RX ORDER — PANTOPRAZOLE SODIUM 20 MG/1
1 TABLET, DELAYED RELEASE ORAL
Qty: 0 | Refills: 0 | DISCHARGE
Start: 2021-01-07

## 2021-01-07 RX ORDER — DIPHENHYDRAMINE HCL 50 MG
50 CAPSULE ORAL ONCE
Refills: 0 | Status: DISCONTINUED | OUTPATIENT
Start: 2021-01-07 | End: 2021-01-11

## 2021-01-07 RX ADMIN — Medication 25 MILLIGRAM(S): at 15:27

## 2021-01-07 RX ADMIN — Medication 2 MILLIGRAM(S): at 00:26

## 2021-01-07 RX ADMIN — Medication 2 MILLIGRAM(S): at 20:51

## 2021-01-07 RX ADMIN — Medication 25 MILLIGRAM(S): at 05:14

## 2021-01-07 RX ADMIN — PANTOPRAZOLE SODIUM 40 MILLIGRAM(S): 20 TABLET, DELAYED RELEASE ORAL at 05:14

## 2021-01-07 NOTE — PROGRESS NOTE BEHAVIORAL HEALTH - RISK ASSESSMENT
HIGH RISK     ACUTE RISK FACTORS: alcohol abuse, recent suicide attempt via overdose of high lethality, depression, poor insight and judgment     CHRONIC RISK FACTORS: alcohol abuse     PROTECTIVE FACTORS: Unknown.

## 2021-01-07 NOTE — DISCHARGE NOTE NURSING/CASE MANAGEMENT/SOCIAL WORK - PATIENT PORTAL LINK FT
You can access the FollowMyHealth Patient Portal offered by [facility] by registering at the following website: [email or web address]. By joining [employer]’s FollowMyHealth portal, you will also be able to view your health information using other applications (apps) compatible with our system.

## 2021-01-07 NOTE — DISCHARGE NOTE PROVIDER - NSDCCPCAREPLAN_GEN_ALL_CORE_FT
PRINCIPAL DISCHARGE DIAGNOSIS  Diagnosis: Ingestion of bleach, intentional self-harm, initial encounter  Assessment and Plan of Treatment:       SECONDARY DISCHARGE DIAGNOSES  Diagnosis: Metabolic alkalosis  Assessment and Plan of Treatment:     Diagnosis: Suicidal ideation  Assessment and Plan of Treatment:     Diagnosis: Drug overdose  Assessment and Plan of Treatment:

## 2021-01-07 NOTE — PROGRESS NOTE BEHAVIORAL HEALTH - NSBHCONSULTPSYCHPLAN_PSY_A_CORE FT
Defer to inpatient team  Haldol 5 mg PO/IM, Ativan 2 mg PO/IM, and Benadryl 50 mg PO/IM PRN Q6H for agitation.

## 2021-01-07 NOTE — DISCHARGE NOTE PROVIDER - NSDCMRMEDTOKEN_GEN_ALL_CORE_FT
LORazepam 2 mg oral tablet: 1 tab(s) orally every 2 hours, As needed, Symptom-triggered 2 point increase in CIWA-Ar  LORazepam 2 mg oral tablet: 1 tab(s) orally every hour, As needed, Symptom-triggered: each CIWA -Ar score 8 or GREATER  pantoprazole 40 mg oral delayed release tablet: 1 tab(s) orally once a day (before a meal)

## 2021-01-07 NOTE — PROGRESS NOTE BEHAVIORAL HEALTH - NSBHCHARTREVIEWVS_PSY_A_CORE FT
Vital Signs Last 24 Hrs  T(C): 36.9 (07 Jan 2021 11:05), Max: 36.9 (06 Jan 2021 20:58)  T(F): 98.5 (07 Jan 2021 11:05), Max: 98.5 (06 Jan 2021 20:58)  HR: 86 (07 Jan 2021 11:05) (57 - 100)  BP: 138/77 (07 Jan 2021 11:05) (118/70 - 148/87)  BP(mean): 88 (07 Jan 2021 03:30) (83 - 88)  RR: 18 (07 Jan 2021 11:05) (17 - 18)  SpO2: 100% (07 Jan 2021 11:05) (99% - 100%)

## 2021-01-07 NOTE — PROGRESS NOTE BEHAVIORAL HEALTH - SUMMARY
Pt is a 35 YOHM with hx of EtOH abuse denies past pscyh hx, who gets suicidal and "emotional" when intoxicated, last night was mixing wine and vodka and became emotional, depressed and took 0.5 bottle of bleach and 10 ibuprofen pills, but denies suicidal intent, saying "I wanted only to hurt myself, but not to die".   Pt admits to getting depressed when drinking, Drinks every day large beer 4-5 bottles and sometimes vodka. Admits to doing so last 2 years. No family here, Immigrated from [place] 7 years ago. Denies sleep and appetite problems  denies energy problems  denies being hopeless now. Denies current SI, HI, AH, VH, PI.   Admits to smoking cigarettes, denies other drugs.  Pt is at high suicide risk and needs inpatient psychiatry level of care.   Once medically cleared and stabilised would transfer to [facility] if bed available.    1.7.2020 - Although denying suicidal ideation at this time, patient is minimizing gravity recent events ( suicide attempt via overdose ) and has poor insight and judgment. Patient remains a high risk for suicide, requiring in-patient hospitalization for safety and stabilization.

## 2021-01-07 NOTE — DISCHARGE NOTE PROVIDER - HOSPITAL COURSE
35M with PMH of Depression and EtOH Abuse presents with suicidal ideation with suicide attempt with ingestion of excessive ibuprofen and a glass of bleach. Patient was drinking more than usual (normal 8-10 beers daily) with >10 beers and a bottle of vodka. Subsequent felt lonely and miserable and wanted to end his life/hurt himself and as such tooks ~10 tablets of ibuprofen and then a cup of bleach. Subsequently reported to the ED with nausua without vomiting. Had vomited last week with reported hematemesis. Associated dizziness and weakness. Denies fever, chills, headache, chest pain, SOB, cough, abdominal pain/discomfort, diarrhea, constipation. Patient reports weight loss that is unable to quantify. Trouble sleeping, anhedonia and constantly depressed daily for 6 years     In the ED HR , Afebrile, CBC, Coag, DBMP unremarkable, lactic acid 3.2, COVID-19 PCR negative, EtOH 470 and ABG 7.32/32/96 on RA    Patient was admitted and started on IVF and CIWA protoxol. GI consulted. Patient underwent EGD which was unremarkable. Diet was advanced and tolerated. No signs of withdrawl. Evaluated by psych recommending discharge to inpatient psych when medically stable. Patient remained stable and medically cleared for discharge to inpatient psych. Discharge management >30minutes

## 2021-01-07 NOTE — PROGRESS NOTE BEHAVIORAL HEALTH - NSBHFUPINTERVALHXFT_PSY_A_CORE
[id number] - Patient presenting calm and cooperative, linear, organized, stating feeling better. Reports not recalling reasons for suicide attempt, stating he was intoxicated. Reports "I will not do that again; I will not drink again and not try and kill myself again." Patient minimizing psychiatric symptoms. Patient denying depressed mood or anhedonia, hopelessness or suicidal ideation. Denies manic / psychotic symptoms. No delusions elicited.

## 2021-01-08 LAB
A1C WITH ESTIMATED AVERAGE GLUCOSE RESULT: 5 % — SIGNIFICANT CHANGE UP (ref 4–5.6)
ESTIMATED AVERAGE GLUCOSE: 97 MG/DL — SIGNIFICANT CHANGE UP (ref 68–114)
SARS-COV-2 IGG SERPL QL IA: NEGATIVE — SIGNIFICANT CHANGE UP
SARS-COV-2 IGM SERPL IA-ACNC: <3.8 AU/ML — SIGNIFICANT CHANGE UP

## 2021-01-08 PROCEDURE — 99223 1ST HOSP IP/OBS HIGH 75: CPT

## 2021-01-08 PROCEDURE — 99232 SBSQ HOSP IP/OBS MODERATE 35: CPT

## 2021-01-08 RX ORDER — CHLORPROMAZINE HCL 10 MG
50 TABLET ORAL EVERY 6 HOURS
Refills: 0 | Status: DISCONTINUED | OUTPATIENT
Start: 2021-01-08 | End: 2021-01-11

## 2021-01-08 RX ORDER — ESCITALOPRAM OXALATE 10 MG/1
5 TABLET, FILM COATED ORAL DAILY
Refills: 0 | Status: DISCONTINUED | OUTPATIENT
Start: 2021-01-09 | End: 2021-01-10

## 2021-01-08 RX ADMIN — Medication 2 MILLIGRAM(S): at 15:33

## 2021-01-08 RX ADMIN — Medication 50 MILLIGRAM(S): at 21:46

## 2021-01-08 NOTE — BH SAFETY PLAN - STEP 6 SAFE ENVIRONMENT
I want to get help with my drinking and stop.  It is causing me problems on my job and relationships.    I will attend AA meetings and get a sponsor.

## 2021-01-08 NOTE — CONSULT NOTE ADULT - SUBJECTIVE AND OBJECTIVE BOX
35M with PMH of Depression and EtOH abuse presents to behaviroal unit for further evaluation and treatmnet of depression and after recent attempt to hurt himself while intoxicated including excessive ibuprofen ingestion and bleach ingestion. Patient voices no physical complains except depressed and irritated and feeling down because he is in the psych unit. He stresses still feeling depressed but no intent to hurt himself or others and/or commit suicide.     Review of Systems: 14 Point review of systems reviewed and reported as negative unless otherwise stated in HPI    T(C): 36.4 (01-08-21 @ 08:22), Max: 37 (01-08-21 @ 06:00)  HR: 63 (01-08-21 @ 06:00) (63 - 86)  BP: 116/71 (01-08-21 @ 06:00) (116/71 - 144/104)  RR: 14 (01-08-21 @ 08:22) (14 - 20)  SpO2: 100% (01-08-21 @ 08:22) (97% - 100%)    PHYSICAL EXAM:    General: AAOx3; NAD  Head: AT/NC  ENT: Moist Mucous Membranes; No Injury  Neck: Non-tender; No JVD  CVS: RRR, S1&S2, No murmur, No edema  Respiratory: Lungs CTA B/L; Normal Respiratory Effort  Abdomen/GI: Soft, non-tender, non-distended, no guarding, no rebound, normal bowel sounds  : No bladder distention, No Cade  Extremites: No cyanosis, No clubbing, No edema  MSK: No CVA tenderness, Normal ROM, No injury  Neuro: AAOx3, CNII-XII grossly intact, non-focal  Psych: Appropriate, Cooperative, Depressed; Denies suicidal ideations  Skin: Clean, Dry and Intact                          12.9   8.50  )-----------( 255      ( 07 Jan 2021 06:02 )             38.6     01-08    136  |  105  |  16  ----------------------------<  91  3.9   |  26  |  1.01    Ca    9.2      08 Jan 2021 08:49  Phos  3.5     01-08  Mg     2.1     01-08    TPro  8.2  /  Alb  4.0  /  TBili  0.9  /  DBili  0.2  /  AST  45<H>  /  ALT  50  /  AlkPhos  67  01-08    COVID-19 PCR: NotDetec (05 Jan 2021 22:30)    CAPILLARY BLOOD GLUCOSE        < from: Xray Chest PA + Lateral + Oblique Bilat 4 Views (01.05.21 @ 23:54) >  IMPRESSION: Clear lungs, unchanged.    < end of copied text >    I personally reviewed vitals, radiology and labs

## 2021-01-08 NOTE — CONSULT NOTE ADULT - REASON FOR ADMISSION
Discharged from medicine and transferred to inpatient psych in the setting of an attempt to hurt himself with excess ibuprofen ingestions + Bleach ingestion.

## 2021-01-08 NOTE — CONSULT NOTE ADULT - ASSESSMENT
ASSESSMENT    Major Depression s/p suicidal/intent to hurt himself with over ingestion of ibuprofen and ingestion of bleach  Alcohol Abuse    PLAN    Depression management as per Psych  Contineu CIWA x 3 more days. PRN Ativan. CIWA scoring low  s/p EGD unremarkable 1/6. Continue PPI  Counseled patient on EtOH Cessation    No other acute medical issues at this time. Will sign off. Recall medicine PRN

## 2021-01-09 DIAGNOSIS — F33.2 MAJOR DEPRESSIVE DISORDER, RECURRENT SEVERE WITHOUT PSYCHOTIC FEATURES: ICD-10-CM

## 2021-01-09 DIAGNOSIS — F10.20 ALCOHOL DEPENDENCE, UNCOMPLICATED: ICD-10-CM

## 2021-01-09 LAB
CHOLEST SERPL-MCNC: 272 MG/DL — HIGH
HDLC SERPL-MCNC: 100 MG/DL — SIGNIFICANT CHANGE UP
LIPID PNL WITH DIRECT LDL SERPL: 139 MG/DL — HIGH
NON HDL CHOLESTEROL: 172 MG/DL — HIGH
TRIGL SERPL-MCNC: 165 MG/DL — HIGH

## 2021-01-09 PROCEDURE — 99232 SBSQ HOSP IP/OBS MODERATE 35: CPT

## 2021-01-09 RX ADMIN — Medication 1 DROP(S): at 23:55

## 2021-01-09 RX ADMIN — ESCITALOPRAM OXALATE 5 MILLIGRAM(S): 10 TABLET, FILM COATED ORAL at 09:19

## 2021-01-09 RX ADMIN — Medication 50 MILLIGRAM(S): at 22:12

## 2021-01-10 PROCEDURE — 99232 SBSQ HOSP IP/OBS MODERATE 35: CPT

## 2021-01-10 RX ORDER — ESCITALOPRAM OXALATE 10 MG/1
10 TABLET, FILM COATED ORAL DAILY
Refills: 0 | Status: DISCONTINUED | OUTPATIENT
Start: 2021-01-11 | End: 2021-01-11

## 2021-01-10 RX ADMIN — Medication 50 MILLIGRAM(S): at 16:42

## 2021-01-10 RX ADMIN — ESCITALOPRAM OXALATE 5 MILLIGRAM(S): 10 TABLET, FILM COATED ORAL at 09:57

## 2021-01-10 RX ADMIN — Medication 1 DROP(S): at 09:57

## 2021-01-10 NOTE — PROGRESS NOTE BEHAVIORAL HEALTH - NSBHCHARTREVIEWLAB_PSY_A_CORE FT
Bedside and Verbal shift change report given to [address] (oncoming nurse) by Demli Velazquez RN (offgoing nurse). Report included the following information SBAR, Kardex, Intake/Output, MAR and Recent Results.
CBC Full  -  ( 2021 06:02 )  WBC Count : 8.50 K/uL  RBC Count : 4.10 M/uL  Hemoglobin : 12.9 g/dL  Hematocrit : 38.6 %  Platelet Count - Automated : 255 K/uL  Mean Cell Volume : 94.1 fl  Mean Cell Hemoglobin : 31.5 pg  Mean Cell Hemoglobin Concentration : 33.4 gm/dL  Auto Neutrophil # : 4.29 K/uL  Auto Lymphocyte # : 2.94 K/uL  Auto Monocyte # : 1.04 K/uL  Auto Eosinophil # : 0.11 K/uL  Auto Basophil # : 0.10 K/uL  Auto Neutrophil % : 50.5 %  Auto Lymphocyte % : 34.6 %  Auto Monocyte % : 12.2 %  Auto Eosinophil % : 1.3 %  Auto Basophil % : 1.2 %        136  |  105  |  16  ----------------------------<  91  3.9   |  26  |  1.01    Ca    9.2      2021 08:49  Phos  3.5     -08  Mg     2.1     -08    TPro  8.2  /  Alb  4.0  /  TBili  0.9  /  DBili  0.2  /  AST  45<H>  /  ALT  50  /  AlkPhos  67  01-08      Urinalysis Basic - ( 2021 19:00 )    Color: Yellow / Appearance: Clear / S.015 / pH: x  Gluc: x / Ketone: Trace  / Bili: Negative / Urobili: Negative mg/dL   Blood: x / Protein: Negative mg/dL / Nitrite: Negative   Leuk Esterase: Negative / RBC: x / WBC x   Sq Epi: x / Non Sq Epi: x / Bacteria: x

## 2021-01-10 NOTE — PROGRESS NOTE BEHAVIORAL HEALTH - NSBHFUPINTERVALHXFT_PSY_A_CORE
Pt seen in the day room . Pt quiet and subdued but slowly more interactive with 1 or 2 peers.  Calmer  and now a bit more introspective after a phone conversation with his sister in [place] whom the pt had called after his serious suicide attempt prior to admission to hospital..  The pt not trying to minimize the potential lethality of his suicide attempt while having a BAL of 470 on admission to the ED on 1/5/2021.  " Patient minimizing psychiatric symptoms. Patient denying depressed mood or anhedonia, hopelessness or suicidal ideation. Denies manic / psychotic symptoms. No delusions elicited.   CIWA to be discontinued as above.  The pt is tolerating newly begun Lexapro 5 mg po q am to treat his severe anxious depression. Plan to increase dose to 10 mg po q am  to further alleviate the pt's anxious depression.
Pt seen i. Calmer  and now a bit more introspective after a phone conversation with his sister in [place] whom the pt had called after his serious suicide attempt prior to admission to hospital..  The pt not trying to minimize the potential lethality of his suicide attempt while having a BAL of 470 on admission to the ED on 1/5/2021.  " Patient minimizing psychiatric symptoms. Patient denying depressed mood or anhedonia, hopelessness or suicidal ideation. Denies manic / psychotic symptoms. No delusions elicited. The pt appeared depressed, neat tearful  with poor eye contact and with angry irritable demeanor and low frustration tolerance with poor impulse control.   The pt remains on a CIWA and he is tolerating newly begun Lexapro 5 mg po q am to treat his severe anxious depression   Informed consent obtained for a trial of low dose SSRI Lexapro and we discussed recommendation for pt dual diagnosis treatment  in light of his severe  ETOH use d/o.
Pt seen in the day room with bilingual staff .  The pt attempted to minimize and to dismiss the potential lethality of his suicide attempt while having a BAL of 470 on admission to the ED on 1/5/2021.  " Patient minimizing psychiatric symptoms. Patient denying depressed mood or anhedonia, hopelessness or suicidal ideation. Denies manic / psychotic symptoms. No delusions elicited. The pt appeared depressed, neat tearful  with poor eye contact and with angry irritable demeanor and low frustration tolerance with poor impulse control.   The pt remains on a CIWA and he was given prn Thorazine to aid with severe mood related agitation as the pt sat with clenched fists and  an increasingly angry volatility.    Informed consent obtained for a trial of low dose SSRI Lexapro and we discussed recommendation for pt dual diagnosis treatment  in light of his severe  ETOH use d/o.

## 2021-01-10 NOTE — PROGRESS NOTE BEHAVIORAL HEALTH - NSBHADMITIPREASON_PSY_A_CORE
Infusion Nursing Note:  Bia Bill presents today for phleb.    Patient seen by provider today: No   present during visit today: Not Applicable.    Note: N/A.    Intravenous Access:  Phleb kit.    Treatment Conditions:  Results reviewed, labs MET treatment parameters, ok to proceed with treatment. Hematocrit: 53.9    Post Infusion Assessment:  Patient tolerated phleb without incident.  500cc phleb done without incident.  Post procedure /61       Discharge Plan:   Patient discharged in stable condition accompanied by: self. Returns in 1wk.    Nacho Jurado, RN, RN                        
Danger to self; mental illness expected to respond to inpatient care

## 2021-01-10 NOTE — PROGRESS NOTE BEHAVIORAL HEALTH - SUMMARY
Pt is a 35 YOHM with hx of EtOH abuse denies past pscyh hx, who gets suicidal and "emotional" when intoxicated, last night was mixing wine and vodka and became emotional, depressed and took 0.5 bottle of bleach and 10 ibuprofen pills, but denies suicidal intent, saying "I wanted only to hurt myself, but not to die".   Pt admits to getting depressed when drinking, Drinks every day large beer 4-5 bottles and sometimes vodka. Admits to doing so last 2 years. No family here, Immigrated from [place] 7 years ago. Denies sleep and appetite problems  denies energy problems  denies being hopeless now. Denies current SI, HI, AH, VH, PI.   Admits to smoking cigarettes, denies other drugs.  Pt is at high suicide risk and needs inpatient psychiatry level of care.   Once medically cleared and stabilised would transfer to [facility] if bed available.    1.7.2020 - Although denying suicidal ideation at this time, patient is minimizing gravity recent events ( suicide attempt via overdose ) and has poor insight and judgment. Patient remains a high risk for suicide, requiring in-patient hospitalization for safety and stabilization.
Pt is a 35 YOHM with hx of EtOH abuse denies past pscyh hx, who gets suicidal and "emotional" when intoxicated, last night was mixing wine and vodka and became emotional, depressed and took 0.5 bottle of bleach and 10 ibuprofen pills, but denies suicidal intent, saying "I wanted only to hurt myself, but not to die".   Pt admits to getting depressed when drinking, Drinks every day large beer 4-5 bottles and sometimes vodka. Admits to doing so last 2 years. No family here, Immigrated from [place] 7 years ago. Denies sleep and appetite problems  denies energy problems  denies being hopeless now. Denies current SI, HI, AH, VH, PI.   Admits to smoking cigarettes, denies other drugs.  Pt is at high suicide risk and needs inpatient psychiatry level of care.   Once medically cleared and stabilised would transfer to [facility] if bed available.    1.7.2020 - Although denying suicidal ideation at this time, patient is minimizing gravity recent events ( suicide attempt via overdose ) and has poor insight and judgment. Patient remains a high risk for suicide, requiring in-patient hospitalization for safety and stabilization.
Pt is a 35 YOHM with hx of EtOH abuse denies past pscyh hx, who gets suicidal and "emotional" when intoxicated, last night was mixing wine and vodka and became emotional, depressed and took 0.5 bottle of bleach and 10 ibuprofen pills, but denies suicidal intent, saying "I wanted only to hurt myself, but not to die".   Pt admits to getting depressed when drinking, Drinks every day large beer 4-5 bottles and sometimes vodka. Admits to doing so last 2 years. No family here, Immigrated from [place] 7 years ago. Denies sleep and appetite problems  denies energy problems  denies being hopeless now. Denies current SI, HI, AH, VH, PI.   Admits to smoking cigarettes, denies other drugs.  Pt is at high suicide risk and needs inpatient psychiatry level of care.   Once medically cleared and stabilised would transfer to [facility] if bed available.    1.7.2020 - Although denying suicidal ideation at this time, patient is minimizing gravity recent events ( suicide attempt via overdose ) and has poor insight and judgment. Patient remains a high risk for suicide, requiring in-patient hospitalization for safety and stabilization.

## 2021-01-10 NOTE — PROGRESS NOTE BEHAVIORAL HEALTH - NSBHADMITMEDEDUDETAILS_A_CORE FT
Informed consent obtained for a pt trial of  SSRI Lexapro to alleviate significant depression

## 2021-01-10 NOTE — PROGRESS NOTE BEHAVIORAL HEALTH - NSBHCHARTREVIEWVS_PSY_A_CORE FT
Vital Signs Last 24 Hrs  T(C): 36.8 (10 Nikos 2021 10:05), Max: 36.8 (10 Nikos 2021 10:05)  T(F): 98.2 (10 Nikos 2021 10:05), Max: 98.2 (10 Nikos 2021 10:05)  HR: 70 (10 Nikos 2021 10:05) (68 - 70)  BP: 120/76 (10 Nikos 2021 10:05) (120/76 - 139/81)  BP(mean): --  RR: 16 (10 Nikos 2021 10:05) (16 - 18)  SpO2: 99% (10 Nikos 2021 10:05) (97% - 99%)
Vital Signs Last 24 Hrs  T(C): 36.8 (09 Jan 2021 14:07), Max: 36.8 (09 Jan 2021 14:07)  T(F): 98.2 (09 Jan 2021 14:07), Max: 98.2 (09 Jan 2021 14:07)  HR: 66 (09 Jan 2021 14:07) (66 - 71)  BP: 129/75 (09 Jan 2021 14:07) (118/63 - 131/79)  BP(mean): --  RR: 18 (09 Jan 2021 14:07) (18 - 18)  SpO2: 100% (09 Jan 2021 14:07) (99% - 100%)
Vital Signs Last 24 Hrs  T(C): 36.4 (08 Jan 2021 08:22), Max: 37 (08 Jan 2021 06:00)  T(F): 97.6 (08 Jan 2021 08:22), Max: 98.6 (08 Jan 2021 06:00)  HR: 63 (08 Jan 2021 06:00) (63 - 86)  BP: 116/71 (08 Jan 2021 06:00) (116/71 - 116/71)  BP(mean): --  RR: 14 (08 Jan 2021 08:22) (14 - 18)  SpO2: 100% (08 Jan 2021 08:22) (100% - 100%)

## 2021-01-10 NOTE — PROGRESS NOTE BEHAVIORAL HEALTH - PROBLEM SELECTOR PLAN 1
1. Lexapro to be increased to 10  mg po q am  2.Pt urged to attend therapy groups as tolerated  3.Dual diagnosis after care treatment strongly encouraged
1. Lexapro 5 mg po q am  2.Pt urged to attend therapy groups as tolerated  3.Dual diagnosis after care treatment strongly encouraged

## 2021-01-10 NOTE — PROGRESS NOTE BEHAVIORAL HEALTH - NSBHCHARTREVIEWIMAGING_PSY_A_CORE FT
MEDICATIONS  (STANDING):  Lexapro 5 mg po q am    MEDICATIONS  (PRN):  chlorproMAZINE    Injectable 50 milliGRAM(s) IntraMuscular every 6 hours PRN severe agitation due to mood  chlorproMAZINE    Tablet 50 milliGRAM(s) Oral every 6 hours PRN moderate to severe anxiety/ agitation due to mood d/o  diphenhydrAMINE   Injectable 50 milliGRAM(s) IntraMuscular once PRN Extrapyramidal prophylaxis  LORazepam     Tablet 2 milliGRAM(s) Oral every 1 hour PRN Symptom-triggered: each CIWA -Ar score 8 or GREATER  LORazepam   Injectable 2 milliGRAM(s) IntraMuscular once PRN Anxiety SEVERE
MEDICATIONS  (STANDING):    MEDICATIONS  (PRN):  chlorproMAZINE    Injectable 50 milliGRAM(s) IntraMuscular every 6 hours PRN severe agitation due to mood  chlorproMAZINE    Tablet 50 milliGRAM(s) Oral every 6 hours PRN moderate to severe anxiety/ agitation due to mood d/o  diphenhydrAMINE   Injectable 50 milliGRAM(s) IntraMuscular once PRN Extrapyramidal prophylaxis  LORazepam     Tablet 2 milliGRAM(s) Oral every 1 hour PRN Symptom-triggered: each CIWA -Ar score 8 or GREATER  LORazepam   Injectable 2 milliGRAM(s) IntraMuscular once PRN Anxiety SEVERE
MEDICATIONS  (STANDING):  escitalopram 5 milliGRAM(s) Oral daily    MEDICATIONS  (PRN):  chlorproMAZINE    Injectable 50 milliGRAM(s) IntraMuscular every 6 hours PRN severe agitation due to mood  chlorproMAZINE    Tablet 50 milliGRAM(s) Oral every 6 hours PRN moderate to severe anxiety/ agitation due to mood d/o  diphenhydrAMINE   Injectable 50 milliGRAM(s) IntraMuscular once PRN Extrapyramidal prophylaxis  LORazepam     Tablet 2 milliGRAM(s) Oral every 1 hour PRN Symptom-triggered: each CIWA -Ar score 8 or GREATER  LORazepam   Injectable 2 milliGRAM(s) IntraMuscular once PRN Anxiety SEVERE

## 2021-01-10 NOTE — PROGRESS NOTE BEHAVIORAL HEALTH - AXIS III
pt s/p suicide attempt via OD bleach   EGD WNL

## 2021-01-10 NOTE — PROGRESS NOTE BEHAVIORAL HEALTH - NSBHFUPSUICINTERVALFT_PSY_A_CORE
recent suicide attempt via overdose of high lethality; HIGH RISK for suicide

## 2021-01-10 NOTE — PROGRESS NOTE BEHAVIORAL HEALTH - PROBLEM SELECTOR PLAN 2
1. To DC CIWA as pt no longer scoring 1/10/2021  2.Pt urged to remain abstinent completely from ETOH  3.Pt urged to attend therapy groups as tolerated  4.Dual diagnosis treatment recommended after DC home.
1.Pt urged to remain abstinent completely from ETOH  2.Pt urged to attend therapy groups as tolerated  3.Dual diagnosis treatment recommended after DC home.

## 2021-01-10 NOTE — PROGRESS NOTE BEHAVIORAL HEALTH - NSBHFUPINTERVALCCFT_PSY_A_CORE
" I talked to my sister. She said it is good I am here in the hospital for my depression."
" I talked to my sister. She said it is good I am here in the hospital for my depression."     Pt not scoring on CIWA X 72 HOURS  Plan to DC CIWA 1/10/2021
" I was drinking and drinking and then I drank the bleach . I thought it was more wine and then it hurt my throat so I stopped. I called my sister in [place] and she called 911. "

## 2021-01-10 NOTE — PROGRESS NOTE BEHAVIORAL HEALTH - PRIMARY DX
MDD (major depressive disorder), recurrent episode, severe

## 2021-01-11 VITALS
SYSTOLIC BLOOD PRESSURE: 144 MMHG | RESPIRATION RATE: 18 BRPM | DIASTOLIC BLOOD PRESSURE: 79 MMHG | TEMPERATURE: 98 F | OXYGEN SATURATION: 100 % | HEART RATE: 67 BPM

## 2021-01-11 LAB — SARS-COV-2 RNA SPEC QL NAA+PROBE: DETECTED

## 2021-01-11 PROCEDURE — 99239 HOSP IP/OBS DSCHRG MGMT >30: CPT

## 2021-01-11 RX ORDER — ESCITALOPRAM OXALATE 10 MG/1
1 TABLET, FILM COATED ORAL
Qty: 0 | Refills: 0 | DISCHARGE
Start: 2021-01-11

## 2021-01-11 RX ORDER — CHLORPROMAZINE HCL 10 MG
1 TABLET ORAL
Qty: 0 | Refills: 0 | DISCHARGE
Start: 2021-01-11

## 2021-01-11 RX ADMIN — ESCITALOPRAM OXALATE 10 MILLIGRAM(S): 10 TABLET, FILM COATED ORAL at 09:43

## 2021-01-11 NOTE — DISCHARGE NOTE BEHAVIORAL HEALTH - MEDICATION SUMMARY - MEDICATIONS TO STOP TAKING
I will STOP taking the medications listed below when I get home from the hospital:    LORazepam 2 mg oral tablet  -- 1 tab(s) by mouth every 2 hours, As needed, Symptom-triggered 2 point increase in CIWA-Ar    LORazepam 2 mg oral tablet  -- 1 tab(s) by mouth every hour, As needed, Symptom-triggered: each CIWA -Ar score 8 or GREATER

## 2021-01-11 NOTE — DISCHARGE NOTE BEHAVIORAL HEALTH - MEDICATION SUMMARY - MEDICATIONS TO TAKE
I will START or STAY ON the medications listed below when I get home from the hospital:    escitalopram 10 mg oral tablet  -- 1 tab(s) by mouth once a day  -- Indication: For Major depressive disorder, recurrent episode, severe with anxious distress    chlorproMAZINE 50 mg oral tablet  -- 1 tab(s) by mouth every 6 hours, As needed, moderate to severe anxiety/ agitation due to mood d/o  -- Indication: For Major depressive disorder, recurrent episode, severe with anxious distress    pantoprazole 40 mg oral delayed release tablet  -- 1 tab(s) by mouth once a day (before a meal)  -- Indication: For GERD

## 2021-01-11 NOTE — DISCHARGE NOTE BEHAVIORAL HEALTH - FAMILY HISTORY OF PSYCHIATRIC ILLNESS
Pt. living in [place]. Pt's wife and 11 yr-old son are residing in [facility] and were planning to visit the pt in the [place]  Pt's sister lives in [place]  Tel #  [phone]- she is aware of pt clinical history and is in contact with the pt

## 2021-01-11 NOTE — CHART NOTE - NSCHARTNOTEFT_GEN_A_CORE
Based on the patient's labs patient is clinically stable for transfer to [facility]. Please obtain vital signs prior to discharge and notify MD for any abnormal results prior to transfer.     Mustapha Ventura DO, [phone]

## 2021-01-11 NOTE — DISCHARGE NOTE BEHAVIORAL HEALTH - NSBHDCMEDICALFT_PSY_A_CORE
s/p suicide attempt via ETOH, Bleach, OD flexeril and ibuprofen  on 1/6/2021  Hyperlipidemia  COVID -19 DETECTED PCR  1/11/2021

## 2021-01-11 NOTE — DISCHARGE NOTE BEHAVIORAL HEALTH - NSBHDCSUICFCTRMIT_PSY_A_CORE
pt is motivated to remain healthy and expressed a wish to work on his sobriety  pt is future oriented and has an 11 yr-old son  with whom he wants to maintain an interaction

## 2021-01-11 NOTE — DISCHARGE NOTE BEHAVIORAL HEALTH - NSBHDCMEDSFT_PSY_A_CORE
Discharge Medicactions  escitalopram 10 milliGRAM(s) Oral daily    MEDICATIONS  (PRN):  chlorproMAZINE    Tablet 50 milliGRAM(s) Oral every 6 hours PRN moderate to severe anxiety/ agitation due to mood d/o  The pt has been educated to continue the medications until told by his outpatient doctor to stop

## 2021-01-11 NOTE — DISCHARGE NOTE BEHAVIORAL HEALTH - NSBHDCCRISISPLAN1FT_PSY_A_CORE
Advised to return to hospital or go to nearest ED or call 911 or [phone] [phone] or [phone] hotlines for any severe, worsening or persistent symptoms including suicidal/homicidal ideations, intent or plans. Patient verbalized understanding of instructions.

## 2021-01-11 NOTE — DISCHARGE NOTE BEHAVIORAL HEALTH - CARE PROVIDER_API CALL
sona magallanes  The pt. is to be directly transferred on 1/11/2021 for admission to [facility] inpatient COVID + Psychiatry unit for ongoing treatment  Phone: (   )    -  Fax: (   )    -  Follow Up Time:

## 2021-01-11 NOTE — DISCHARGE NOTE BEHAVIORAL HEALTH - NSBHDCTESTSFT_PSY_A_CORE
COVID-19 PCR POSITIVE DETECTED 1/11/2021  TSH=1.86 COVID-19 PCR POSITIVE DETECTED 1/11/2021  TSH=1.86  Fasting lipids  Cholesterol = 272  NT=270  COVID NEGATIVE 1/5/2021  COVID-19 PCR POSITIVE 1/11/2021  HgA1C= 5.0  BAL= 470  on admission  Chest CT and Abdominal CT WNL COVID-19 PCR POSITIVE DETECTED 1/11/2021  TSH=1.86  Fasting lipids  Cholesterol = 272  BR=522  COVID NEGATIVE 1/5/2021  COVID-19 PCR POSITIVE 1/11/2021  HgA1C= 5.0  BAL= 470  on admission  Chest CT and Abdominal CT WNL  EGD via GI consult WNL  No studies are pending

## 2021-01-11 NOTE — DISCHARGE NOTE BEHAVIORAL HEALTH - CONDITIONS AT DISCHARGE
Pt discharged to [facility], for ongoing psychiatric treatment. Pt is alert and oriented x4, and remains anxious and depressed but in behavioral control at this time. Pt is easily redirected if needed, compliant with meds, and social with peers/attending groups at this time. He left with all belongings in appropriate attire and footwear.

## 2021-01-11 NOTE — DISCHARGE NOTE BEHAVIORAL HEALTH - PROVIDER TOKENS
FREE:[LAST:[tba],FIRST:[tba],PHONE:[(   )    -],FAX:[(   )    -],ADDRESS:[The pt. is to be directly transferred on 1/11/2021 for admission to [facility] inpatient COVID + Psychiatry unit for ongoing treatment]]

## 2021-01-11 NOTE — DISCHARGE NOTE BEHAVIORAL HEALTH - HPI (INCLUDE ILLNESS QUALITY, SEVERITY, DURATION, TIMING, CONTEXT, MODIFYING FACTORS, ASSOCIATED SIGNS AND SYMPTOMS)
Principal diagnosis at discharge: Major depression recurrent with anxious distress  The pt. is  35 year-old [place] male with hx of severe EtOH use disorder who denies past psychiatric hx, who reported that he gets suicidal and "emotional" when intoxicated, last night was mixing wine and vodka and became emotional, depressed and took 0.5 bottle of bleach and 10 ibuprofen pills, but denies suicidal intent, saying "I wanted only to hurt myself, but not to die".   Pt admits to getting depressed when drinking, Drinks every day large beer 4-5 bottles and sometimes vodka. Admits to doing so last 2 years. No family here, Immigrated from [place] 7 years ago. Denies sleep and appetite problems  denies energy problems  denies being hopeless now. Denies current SI, HI, AH, VH, PI.   Admits to smoking cigarettes, denies other drugs.  The pt was admitted initially to Medicine for detox from ETOH and was consulted by  the psychiatry consult service who deemed the pt with ongoing major depression and anxiety s/p a  potentially lethal suicide attempt while intoxicated. When seen by Consult service on 1/7/2021:    Although denying suicidal ideation at this time, patient is minimizing gravity recent events ( suicide attempt via overdose ) and has poor insight and judgment. Patient remains a high risk for suicide, requiring in-patient hospitalization for safety and stabilization.  The pt , once medically stabilized, was transferred to  inpatient psychiatry  on 1/7/2021 for ongoing treatment of the pt's dual diagnosis anxious depression and comorbid severe ETOH use d/o.                  Course of Hospitalization    The pt has begun to slowly improve as to his anxious depression with ongoing discussion related to dual diagnosis treatment. The pt is tolerating Lexapro increased to 10 mg po q am without side effects and with  slowly evident clinical improvement. On 1/11/2021, the pt tested COVID +  after initial COVID NEG RESULT ON 1/5/2021. The pt remains asymptomatic but will need to be transferred to [facility] for ongoing treatment on their COVID+ Psychiatry unit.

## 2021-01-11 NOTE — DISCHARGE NOTE BEHAVIORAL HEALTH - NSBHDCSUICSAFETYFT_PSY_A_CORE
1.Safety planning reviewed  2.Pt for direct transfer to inpatient psychiatry for ongoing treatment of his anxious depression  3.Pt to be given tel # for Crisis Hotline 24/7  contact the pt can call should future safety concerns arise  4.Pt and family aware that the pt can always return 24/7 to the nearest hospital ED  for reevaluation should new pt safety concerns arise
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
## 2021-01-12 DIAGNOSIS — T39.312A POISONING BY PROPIONIC ACID DERIVATIVES, INTENTIONAL SELF-HARM, INITIAL ENCOUNTER: ICD-10-CM

## 2021-01-12 DIAGNOSIS — F17.210 NICOTINE DEPENDENCE, CIGARETTES, UNCOMPLICATED: ICD-10-CM
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
## 2021-01-12 DIAGNOSIS — K31.89 OTHER DISEASES OF STOMACH AND DUODENUM: ICD-10-CM

## 2021-01-12 DIAGNOSIS — E87.3 ALKALOSIS: ICD-10-CM

## 2021-01-12 DIAGNOSIS — F33.2 MAJOR DEPRESSIVE DISORDER, RECURRENT SEVERE WITHOUT PSYCHOTIC FEATURES: ICD-10-CM

## 2021-01-12 DIAGNOSIS — T54.92XA TOXIC EFFECT OF UNSPECIFIED CORROSIVE SUBSTANCE, INTENTIONAL SELF-HARM, INITIAL ENCOUNTER: ICD-10-CM

## 2021-01-12 DIAGNOSIS — F10.14 ALCOHOL ABUSE WITH ALCOHOL-INDUCED MOOD DISORDER: ICD-10-CM

## 2021-01-12 DIAGNOSIS — K29.70 GASTRITIS, UNSPECIFIED, WITHOUT BLEEDING: ICD-10-CM

## 2021-01-14 DIAGNOSIS — F19.94 OTHER PSYCHOACTIVE SUBSTANCE USE, UNSPECIFIED WITH PSYCHOACTIVE SUBSTANCE-INDUCED MOOD DISORDER: ICD-10-CM

## 2021-01-14 DIAGNOSIS — U07.1 COVID-19: ICD-10-CM

## 2021-01-14 DIAGNOSIS — F17.210 NICOTINE DEPENDENCE, CIGARETTES, UNCOMPLICATED: ICD-10-CM

## 2021-01-14 DIAGNOSIS — F33.2 MAJOR DEPRESSIVE DISORDER, RECURRENT SEVERE WITHOUT PSYCHOTIC FEATURES: ICD-10-CM

## 2021-01-14 DIAGNOSIS — F10.20 ALCOHOL DEPENDENCE, UNCOMPLICATED: ICD-10-CM

## 2021-03-04 NOTE — ED ADULT NURSE NOTE - NS_SISCREENINGSR_GEN_ALL_ED
Genetics New Patient Intake Form    Patient Details:      Arnaldo Jaime     1958     [id number]     Background Information:         Who is calling to schedule?                                            self    If not self, relationship to patient? Referring Provider Pastor Haile    Is the referral marked STAT No    Is patient newly diagnosed with cancer, have metastatic disease, or pending surgery? No    If yes, which is it? If the patient is pending surgery Needs to be scheduled within 48 hours  If none available, schedule patient then email Stat cancer genetics    If the patient is metastatic or newly diagnosed Needs to be scheduled within 2 weeks  If none available, schedule patient then email Stat cancer genetics    Have you had genetic testing that showed a positive genetic mutation? (If yes, schedule within 2 weeks or email STAT cancer genetics) Yes    Has your family member had genetic testing that resulted in a positive genetic mutation? (If yes in the last 6 months, schedule within 3 weeks )  (If yes but over 6 months, schedule as usual) no    Is this a personal or family history? personal and family    What is the type of tumor? 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 No    Did the patient schedule an appointment? 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
Negative

## 2021-08-19 NOTE — PATIENT PROFILE ADULT - FALL HARM RISK TYPE OF ASSESSMENT
Anesthesia Pre Eval Note    Anes Review of Systems    Relevant Problems   No relevant active problems       Physical Exam     Airway   Mallampati: IV  TM Distance: >3 FB  Neck ROM: Limited  Neck: C-collar and Stiffness  TMJ Mobility: Good    Cardiovascular  Cardiovascular exam normal  Cardio Rhythm: Irregular    General Assessment  General Assessment: Alert and oriented and Mild distress    Dental Exam  Dental exam normal  Patient has:  Poor Dentition    Pulmonary Exam  Pulmonary exam normal  Breath sounds clear to auscultation:  Yes    Abdominal Exam  Abdominal exam normal      Anesthesia Plan:  Anesthesia Plan    ASA Status: 3    Anesthesia Type: General    Induction: Intravenous  Maintenance: Inhalational      Post-op Pain Management: Per Surgeon      Checklist  Reviewed: Patient Summary, Allergies, Past Med History, NPO Status, Problem list and Medications  Consent/Risks Discussed Statement:  The proposed anesthetic plan, including its risks and benefits, have been discussed with the Patient, Healthcare Power of  and Spouse along with the risks and benefits of alternatives. Questions were encouraged and answered and the patient and/or representative understands and agrees to proceed.    I have discussed elements of the patient's history or examination, as noted above and/or as follows, that place the patient at higher risk of complications; age.    I discussed with the patient (and/or patient's legal representative) the risks and benefits of the proposed anesthesia plan, General, which may include services performed by other anesthesia providers.    Alternative anesthesia plans, if available, were reviewed with the patient (and/or patient's legal representative). Discussion has been held with the patient (and/or patient's legal representative) regarding risks of anesthesia, which include Nausea, Vomiting, Headache, Hypotension, Dental Injury, Allergic Reaction, Sore Throat and emergence delirium and  emergent situations that may require change in anesthesia plan.    The patient (and/or patient's legal representative) has indicated understanding, his/her questions have been answered, and he/she wishes to proceed with the planned anesthetic.    Informed Consent for Blood: Consented     admission

## 2022-02-01 NOTE — DISCHARGE NOTE BEHAVIORAL HEALTH - LAUNCH MEDICATION RECONCILIATION
What Is The Reason For Today's Visit?: History of Dysplastic Nevi
<<-----Click here for Discharge Medication Review

## 2022-06-07 NOTE — ED ADULT NURSE NOTE - NSSEPSISCRITERIAMET_ED_A_ED
My Depression Action Plan  Name: Chaya Bills   Date of Birth 1957  Date: 6/7/2022    My doctor: Fidencio Aguirre   My clinic: [facility] [place]  [id number]  [place] [id number] [phone]          GREEN    ZONE   Good Control    What it looks like:     Things are going generally well. You have normal ups and downs. You may even feel depressed from time to time, but bad moods usually last less than a day.   What you need to do:  1. Continue to care for yourself (see self care plan)  2. Check your depression survival kit and update it as needed  3. Follow your physician s recommendations including any medication.  4. Do not stop taking medication unless you consult with your physician first.           YELLOW         ZONE Getting Worse    What it looks like:     Depression is starting to interfere with your life.     It may be hard to get out of bed; you may be starting to isolate yourself from others.    Symptoms of depression are starting to last most all day and this has happened for several days.     You may have suicidal thoughts but they are not constant.   What you need to do:     1. Call your care team. Your response to treatment will improve if you keep your care team informed of your progress. Yellow periods are signs an adjustment may need to be made.     2. Continue your self-care.  Just get dressed and ready for the day.  Don't give yourself time to talk yourself out of it.    3. Talk to someone in your support network.    4. Open up your Depression Self-Care Plan/Wellness Kit.           RED    ZONE Medical Alert - Get Help    What it looks like:     Depression is seriously interfering with your life.     You may experience these or other symptoms: You can t get out of bed most days, can t work or engage in other necessary activities, you have trouble taking care of basic hygiene, or basic responsibilities, thoughts of suicide or death that will not  go away, self-injurious behavior.     What you need to do:  1. Call your care team and request a same-day appointment. If they are not available (weekends or after hours) call your local crisis line, emergency room or 911.          Depression Self-Care Plan / Wellness Kit    Many people find that medication and therapy are helpful treatments for managing depression. In addition, making small changes to your everyday life can help to boost your mood and improve your wellbeing. Below are some tips for you to consider. Be sure to talk with your medical provider and/or behavioral health consultant if your symptoms are worsening or not improving.     Sleep   Sleep hygiene  means all of the habits that support good, restful sleep. It includes maintaining a consistent bedtime and wake time, using your bedroom only for sleeping or sex, and keeping the bedroom dark and free of distractions like a computer, smartphone, or television.     Develop a Healthy Routine  Maintain good hygiene. Get out of bed in the morning, make your bed, brush your teeth, take a shower, and get dressed. Don t spend too much time viewing media that makes you feel stressed. Find time to relax each day.    Exercise  Get some form of exercise every day. This will help reduce pain and release endorphins, the  feel good  chemicals in your brain. It can be as simple as just going for a walk or doing some gardening, anything that will get you moving.      Diet  Strive to eat healthy foods, including fruits and vegetables. Drink plenty of water. Avoid excessive sugar, caffeine, alcohol, and other mood-altering substances.     Stay Connected with Others  Stay in touch with friends and family members.    Manage Your Mood  Try deep breathing, massage therapy, biofeedback, or meditation. Take part in fun activities when you can. Try to find something to smile about each day.     Psychotherapy  Be open to working with a therapist if your provider recommends it.      Medication  Be sure to take your medication as prescribed. Most anti-depressants need to be taken every day. It usually takes several weeks for medications to work. Not all medicines work for all people. It is important to follow-up with your provider to make sure you have a treatment plan that is working for you. Do not stop your medication abruptly without first discussing it with your provider.    Crisis Resources   These hotlines are for both adults and children. They and are open 24 hours a day, 7 days a week unless noted otherwise.      National Suicide Prevention Lifeline   1-776-944-TALK (0145)      Crisis Text Line    www.crisistextline.org  Text HOME to 111233 from anywhere in the [place], anytime, about any type of crisis. A live, trained crisis counselor will receive the text and respond quickly.      [place] Lifeline for LGBTQ Youth  A national crisis intervention and suicide lifeline for LGBTQ youth under 25. Provides a safe place to talk without judgement. Call 1-578.198.3595; text START to 140950 or visit www.thetrevorproject.org to talk to a trained counselor.      For [facility] crisis numbers, visit the [facility] website at:  https://[place].gov/dhs/people-we-serve/adults/health-care/mental-health/resources/crisis-contacts.jsp     Abnormal Lactate

## 2022-08-22 RX ORDER — OMEPRAZOLE 10 MG/1
1 CAPSULE, DELAYED RELEASE ORAL
Qty: 0 | Refills: 0 | DISCHARGE

## 2022-09-25 NOTE — ED PROVIDER NOTE - PROGRESS NOTE DETAILS
mom states son fell down about 6 wood stairs, hitting head, crying, no LOC  awake alert, + lump to right forehead, pt was in a walker & went down the stairs, + bruise to head
pt is resting comfortably no further episodes of hematemesis agrees to f/u with GI, rodney PO in ER.

## 2022-10-18 NOTE — ED ADULT NURSE NOTE - ED CARDIAC RATE
Patient Education     Viral Upper Respiratory Illness (Adult)    You have a viral upper respiratory illness (URI), which is another term for the common cold. This illness is contagious during the first few days. It is spread through the air by coughing and sneezing. It may also be spread by direct contact (touching the sick person and then touching your own eyes, nose, or mouth). Frequent handwashing will decrease risk of spread. Most viral illnesses go away within 7 to 10 days with rest and simple home remedies. Sometimes the illness may last for several weeks. Antibiotics will not kill a virus, and they are generally not prescribed for this condition.  Home care  If symptoms are severe, rest at home for the first 2 to 3 days. When you resume activity, don't let yourself get too tired.  Don't smoke. If you need help stopping, talk with your healthcare provider.  Avoid being exposed to cigarette smoke (yours or others’).  You may use acetaminophen or ibuprofen to control pain and fever, unless another medicine was prescribed. If you have chronic liver or kidney disease, have ever had a stomach ulcer or gastrointestinal bleeding, or are taking blood-thinning medicines, talk with your healthcare provider before using these medicines. Aspirin should never be given to anyone under 18 years of age who is ill with a viral infection or fever. It may cause severe liver or brain damage.  Your appetite may be poor, so a light diet is fine. Stay well hydrated by drinking 6 to 8 glasses of fluids per day (water, soft drinks, juices, tea, or soup). Extra fluids will help loosen secretions in the nose and lungs.  Over-the-counter cold medicines will not shorten the length of time you’re sick, but they may be helpful for the following symptoms: cough, sore throat, and nasal and sinus congestion. If you take prescription medicines, ask your healthcare provider or pharmacist which over-the-counter medicines are safe to use. (Note:  Don't use decongestants if you have high blood pressure.)  Follow-up care  Follow up with your healthcare provider, or as advised.  When to seek medical advice  Call your healthcare provider right away if any of these occur:  Cough with lots of colored sputum (mucus)  Severe headache; face, neck, or ear pain  Difficulty swallowing due to throat pain  Fever of 100.4°F (38°C) or higher, or as directed by your healthcare provider  Call 911  Call 911 if any of these occur:  Chest pain, shortness of breath, wheezing, or difficulty breathing  Coughing up blood  Very severe pain with swallowing, especially if it goes along with a muffled voice   [employer] last reviewed this educational content on 6/1/2018  © 1604-7480 [employer]. All rights reserved. This information is not intended as a substitute for professional medical care. Always follow your healthcare professional's instructions.             Please check the results of your COVID PCR swab on the [employer] marcie. Please quarantine at home until your results are back. Please access the test results on the [employer] marcie.     If you test negative for COVID-- this means it is highly unlikely your symptoms are due to COVID 19. No test is 100% in diagnosing COVID. However, the COVID test you had is 97% accurate in detecting COVID. If you have minimal to no symptoms- you may return to work/school based on the guidelines set forth by your employer or school.      If you test positive--this means you are infected with the COVID-19 virus. Most people with COVID-19 have mild illness and can recover at home. Do not leave your home, except to get medical care. Do not visit public areas.Take care of yourself. Get rest and stay hydrated. Take over-the-counter medicines, such as acetaminophen, to help you feel better.  You should remain in quarantine for 5 days from symptom onset and not return to school/work until you have 24 hours with no fever without the use of fever  reducing medications AND other symptoms of COVID-19 are improving.    If you have shortness of breath, vomiting, severe headaches or any other concerns that you feel require medical attention-- please go to your nearest emergency department    normal

## 2023-04-11 NOTE — DISCHARGE NOTE BEHAVIORAL HEALTH - NSBHDCSUICFCTRSFT_PSY_A_CORE
Cleaning    Weekly you should wash the water tub and air tubing in warm water using mild soap (such as baby shampoo) solution. Do not wash in  or washing machine.   Rinse the water tub and air tubing thoroughly and allow to dry out of direct sunlight and/or heat.  3.  Wipe the exterior of the device with a dry cloth using a baby wipe every day or every other day.  4.  Mask or pillow cushions should be wiped out daily, once weekly with soapy water.  5.  Headgear should be washed weekly and let air dry. Avoid hanging headgear to dry.    Checking and replacing your parts regularly    It is important for your comfort and health that you check and replace your parts and supplies regularly. Replacing your parts and supplies on a regular basis helps helps ensure you are receiving optimal therapy and continued comfort.    Schedule for supply replacement includes:     Every 2 weeks-monthly replacement for cushion or pillows, or memory foam liner  Every 3 month replacement includes mask frame, air tubing, or full face mask liner  Every 6 months includes headgear, water tub, and filters.  You may need to change air filters more frequently if visibly soiled or have pets, especially cats.    Please don't drive or operate heavy machinery while feeling drowsy!  Use precautionary measures like pulling over if feeling drowsy, or consuming caffeine before driving to prevent this.    maintain sobriety  sleep hygiene  treatment compliance with outpatient follow up

## 2023-05-09 NOTE — PROGRESS NOTE BEHAVIORAL HEALTH - NSBHFUPTYPE_PSY_A_CORE
Patient had US abdomen 2/4/2023 that showed enlarged liver measuring 17.5 cm and no gallstone. He would have left or right side random times. He would have needle prick in the epigastrium. He would have chest pain as well. He would get pain in his fingers and muscle pain and aches. Left finger and foot and start to tingling. Pain on the side and left back side. This is resolved after a bowel movement. It is worse when he lies down. This has been going on since last year. He would need to urinate every 2-3 hours at night time. He would have urge to use the restroom around 1-2 AM. He reports of change in bowel movement. It is between Dunn 2-3. He would alternate between constipation and diarrhea. It interrupts his sleep. Before 2021, he would have diarrhea. Change in his diet, change in bowel habit. No blood in the stool. He takes omeprazole as needed. It does not help with chest pain. He would still have abdominal pain despite taking PPI. He went to urgent care last year and was given antibiotics. The abdominal pain is going on for 9 months. CT abdomen 10/29/2022 without contrast showed mild hepatomegaly. Nonobstructive bowel pattern. No diverticulosis. He feels better after he goes to gym to work out. His chest pain would go away. He would go to the gym four times a week. He feels better after he works out and a lot of GI symptom is resolved. Last time he took omeprazole was 2-3 days ago. 
Inpatient

## 2024-01-24 NOTE — ED ADULT NURSE NOTE - CAS DISCH ACCOMP BY
Transport Body Location Override (Optional - Billing Will Still Be Based On Selected Body Map Location If Applicable): right upper back Detail Level: Simple Was A Bandage Applied: Yes Punch Size In Mm: 5 Size Of Lesion In Cm (Optional): 0 Depth Of Punch Biopsy: dermis Biopsy Type: H and E Anesthesia Type: 1% lidocaine with epinephrine Anesthesia Volume In Cc: 0.5 Hemostasis: None Epidermal Sutures: none, closed by secondary intention Wound Care: Petrolatum Dressing: bandage Patient Will Remove Sutures At Home?: No Lab: [id number] Lab Facility: [id number] Consent: Written consent was obtained and risks were reviewed including but not limited to scarring, infection, bleeding, scabbing, incomplete removal, nerve damage and allergy to anesthesia. Post-Care Instructions: I reviewed with the patient in detail post-care instructions. Patient is to keep the biopsy site dry overnight, and then apply bacitracin twice daily until healed. Patient may apply hydrogen peroxide soaks to remove any crusting. Home Suture Removal Text: Patient was provided a home suture removal kit and will remove their sutures at home.  If they have any questions or difficulties they will call the office. Notification Instructions: Patient will be notified of biopsy results. However, patient instructed to call the office if not contacted within 2 weeks. Billing Type: Third-Party Bill Information: Selecting Yes will display possible errors in your note based on the variables you have selected. This validation is only offered as a suggestion for you. PLEASE NOTE THAT THE VALIDATION TEXT WILL BE REMOVED WHEN YOU FINALIZE YOUR NOTE. IF YOU WANT TO FAX A PRELIMINARY NOTE YOU WILL NEED TO TOGGLE THIS TO 'NO' IF YOU DO NOT WANT IT IN YOUR FAXED NOTE.

## 2024-07-25 NOTE — PATIENT PROFILE BEHAVIORAL HEALTH - NSBHAFFECT_PSY_A_CORE
***full range

## 2025-02-23 NOTE — ED ADULT NURSE NOTE - NS_HUMANTRAFFICKQ1_ED_ALL_ED
Patient was reevaluated.  He did not complain of any abdominal pain while he has been here.  He tolerated p.o.  His repeat abdominal exam reveals nontender abdomen there is no right lower quadrant or periumbilical pain.  The patient is well-appearing.  In my opinion this is more gastritis.  Even though the ultrasound is nonvisualization of the appendix I think appendicitis is unlikely.  I discussed these findings with the mother and father who agree with plan and will forego further imaging at this time will observe and reevaluate.  If pain returns patient will return to the ED for further evaluation. No